# Patient Record
Sex: FEMALE | Race: WHITE | NOT HISPANIC OR LATINO | Employment: FULL TIME
[De-identification: names, ages, dates, MRNs, and addresses within clinical notes are randomized per-mention and may not be internally consistent; named-entity substitution may affect disease eponyms.]

---

## 2017-06-17 ENCOUNTER — HEALTH MAINTENANCE LETTER (OUTPATIENT)
Age: 52
End: 2017-06-17

## 2018-06-23 ENCOUNTER — HEALTH MAINTENANCE LETTER (OUTPATIENT)
Age: 53
End: 2018-06-23

## 2018-11-19 ENCOUNTER — TRANSFERRED RECORDS (OUTPATIENT)
Dept: HEALTH INFORMATION MANAGEMENT | Facility: CLINIC | Age: 53
End: 2018-11-19
Payer: COMMERCIAL

## 2019-06-18 ENCOUNTER — TRANSFERRED RECORDS (OUTPATIENT)
Dept: HEALTH INFORMATION MANAGEMENT | Facility: CLINIC | Age: 54
End: 2019-06-18
Payer: COMMERCIAL

## 2019-10-01 ENCOUNTER — HEALTH MAINTENANCE LETTER (OUTPATIENT)
Age: 54
End: 2019-10-01

## 2020-09-25 ENCOUNTER — TRANSFERRED RECORDS (OUTPATIENT)
Dept: HEALTH INFORMATION MANAGEMENT | Facility: CLINIC | Age: 55
End: 2020-09-25
Payer: COMMERCIAL

## 2020-10-09 ENCOUNTER — TRANSFERRED RECORDS (OUTPATIENT)
Dept: HEALTH INFORMATION MANAGEMENT | Facility: CLINIC | Age: 55
End: 2020-10-09
Payer: COMMERCIAL

## 2020-10-09 LAB — EJECTION FRACTION: 64 %

## 2020-10-13 ENCOUNTER — TRANSFERRED RECORDS (OUTPATIENT)
Dept: HEALTH INFORMATION MANAGEMENT | Facility: CLINIC | Age: 55
End: 2020-10-13
Payer: COMMERCIAL

## 2020-11-13 ENCOUNTER — TRANSFERRED RECORDS (OUTPATIENT)
Dept: HEALTH INFORMATION MANAGEMENT | Facility: CLINIC | Age: 55
End: 2020-11-13

## 2021-01-15 ENCOUNTER — HEALTH MAINTENANCE LETTER (OUTPATIENT)
Age: 56
End: 2021-01-15

## 2021-04-26 ENCOUNTER — TRANSFERRED RECORDS (OUTPATIENT)
Dept: HEALTH INFORMATION MANAGEMENT | Facility: CLINIC | Age: 56
End: 2021-04-26
Payer: COMMERCIAL

## 2021-04-26 LAB
ALT SERPL-CCNC: 31 U/L (ref 5–50)
AST SERPL-CCNC: 35 U/L (ref 10–35)
CHOLESTEROL (EXTERNAL): 259 MG/DL
CREATININE (EXTERNAL): 3.18 MG/DL (ref 0.5–0.9)
GLUCOSE (EXTERNAL): 172 MG/DL (ref 65–99)
GLUCOSE (EXTERNAL): 172 MG/DL (ref 65–99)
HEP C HIM: ABNORMAL
POTASSIUM (EXTERNAL): 5.1 MEQ/L (ref 3.5–5)
TRIGLYCERIDES (EXTERNAL): 139 MG/DL

## 2021-06-30 LAB
ALT SERPL-CCNC: 32 U/L (ref 5–50)
AST SERPL-CCNC: 31 U/L (ref 10–35)
CREATININE (EXTERNAL): 3.11 MG/DL (ref 0.5–0.9)
GLUCOSE (EXTERNAL): 233 MG/DL (ref 65–99)
POTASSIUM (EXTERNAL): 5.2 MEQ/L (ref 3.5–5)

## 2021-09-04 ENCOUNTER — HEALTH MAINTENANCE LETTER (OUTPATIENT)
Age: 56
End: 2021-09-04

## 2021-09-29 ENCOUNTER — TRANSFERRED RECORDS (OUTPATIENT)
Dept: HEALTH INFORMATION MANAGEMENT | Facility: CLINIC | Age: 56
End: 2021-09-29
Payer: COMMERCIAL

## 2021-09-29 LAB
CREATININE (EXTERNAL): 4.23 MG/DL (ref 0.5–0.9)
GLUCOSE (EXTERNAL): 276 MG/DL (ref 65–99)
POTASSIUM (EXTERNAL): 5.9 MEQ/L (ref 3.5–5)

## 2022-01-01 ENCOUNTER — TELEPHONE (OUTPATIENT)
Dept: TRANSPLANT | Facility: CLINIC | Age: 57
End: 2022-01-01

## 2022-01-01 ENCOUNTER — HEALTH MAINTENANCE LETTER (OUTPATIENT)
Age: 57
End: 2022-01-01

## 2022-01-01 ENCOUNTER — TRANSFERRED RECORDS (OUTPATIENT)
Dept: HEALTH INFORMATION MANAGEMENT | Facility: CLINIC | Age: 57
End: 2022-01-01

## 2022-01-01 ENCOUNTER — TEAM CONFERENCE (OUTPATIENT)
Dept: TRANSPLANT | Facility: CLINIC | Age: 57
End: 2022-01-01

## 2022-02-19 ENCOUNTER — HEALTH MAINTENANCE LETTER (OUTPATIENT)
Age: 57
End: 2022-02-19

## 2022-03-28 ENCOUNTER — TRANSFERRED RECORDS (OUTPATIENT)
Dept: HEALTH INFORMATION MANAGEMENT | Facility: CLINIC | Age: 57
End: 2022-03-28
Payer: COMMERCIAL

## 2022-04-05 ENCOUNTER — TRANSFERRED RECORDS (OUTPATIENT)
Dept: HEALTH INFORMATION MANAGEMENT | Facility: CLINIC | Age: 57
End: 2022-04-05
Payer: COMMERCIAL

## 2022-04-16 ENCOUNTER — HEALTH MAINTENANCE LETTER (OUTPATIENT)
Age: 57
End: 2022-04-16

## 2022-04-29 ENCOUNTER — REFERRAL (OUTPATIENT)
Dept: TRANSPLANT | Facility: CLINIC | Age: 57
End: 2022-04-29
Payer: COMMERCIAL

## 2022-04-29 VITALS — WEIGHT: 135 LBS | BODY MASS INDEX: 22.49 KG/M2 | HEIGHT: 65 IN

## 2022-04-29 DIAGNOSIS — N18.5 CHRONIC KIDNEY DISEASE, STAGE V (H): ICD-10-CM

## 2022-04-29 DIAGNOSIS — N18.9 CHRONIC RENAL FAILURE: ICD-10-CM

## 2022-04-29 DIAGNOSIS — Z87.891 HISTORY OF TOBACCO USE: ICD-10-CM

## 2022-04-29 DIAGNOSIS — E10.9 TYPE 1 DIABETES MELLITUS (H): ICD-10-CM

## 2022-04-29 DIAGNOSIS — I25.10 CARDIOVASCULAR DISEASE: ICD-10-CM

## 2022-04-29 DIAGNOSIS — Z01.818 ENCOUNTER FOR PRE-TRANSPLANT EVALUATION FOR KIDNEY AND PANCREAS TRANSPLANT: ICD-10-CM

## 2022-04-29 DIAGNOSIS — E10.9 DIABETES MELLITUS TYPE 1 (H): Primary | ICD-10-CM

## 2022-04-29 DIAGNOSIS — N18.6 ESRD (END STAGE RENAL DISEASE) (H): ICD-10-CM

## 2022-04-29 DIAGNOSIS — Z76.82 ORGAN TRANSPLANT CANDIDATE: ICD-10-CM

## 2022-04-29 NOTE — TELEPHONE ENCOUNTER
PCP: Dr. Anushka Palomo   Referring Provider: Self   Referring Diagnosis: ESRD/Type 1 DM, Insulin Depend.     GFR/Date: 11 (9/29/21)    Is patient under the age of 65? Yes  Is patient diabetic? Yes  Is patient on insulin? Yes  Was patient offered a pancreas transplant referral? Yes    Is patient in a group home/assisted living? No  Does patient have a guardian? No    Referral intake process completed.  Patient is aware that after financial approval is received, medical records will be requested.   Patient confirmed for a callback from transplant coordinator on 5/9/22. (within 2 weeks)  Tentative evaluation date 6/9/22 (within 4 weeks) if appointment is virtual, does patient have capabilities of setting this up? Yes, but patient would prefer coming into clinic for eval.     Confirmed coordinator will discuss evaluation process in more detail at the time of their call.   Patient is aware of the need to arrange age appropriate cancer screening, vaccinations, and dental care.  Reminded patient to complete questionnaire, complete medical records release, and review packet prior to evaluation visit .  Assessed patient for special needs (ie-wheelchair, assistance, guardian, and ):  None  Patient instructed to call [phone] with questions.     Patient gave verbal consent during intake call to obtain medical records and documents outside of [facility]:  Yes     GLORIA Escoto, LPN       Solid Organ Transplant

## 2022-05-04 ENCOUNTER — TRANSFERRED RECORDS (OUTPATIENT)
Dept: HEALTH INFORMATION MANAGEMENT | Facility: CLINIC | Age: 57
End: 2022-05-04
Payer: COMMERCIAL

## 2022-05-09 NOTE — TELEPHONE ENCOUNTER
Contacted patient and introduced myself as their Transplant Coordinator, also introduced the role of the Transplant Coordinator in the transplant process.  Explained the purpose of this call including reviewing next steps and answering questions.    Confirmed Referring Provider, Dialysis Center, and Primary Care Physician. Notified patient of the importance of continued communication with referring providers and primary care physicians.    Reviewed components of transplant evaluation process including necessary appointments, tests, and procedures.    Answered questions for patient regarding evaluation, provided my name and contact information and requested they call with any additional questions.    Determined that patient would like additional information regarding transplant by:     Drop Down choices: Mail, Email, MyChart, Phone Call   Encourage MyChart   Notified patients that they will hear from a Transplant  to schedule evaluation.       Reviewed pt's chart for pre-kidney/pancreas transplant evaluation planning. Pt lives in [place], [place] and is currently listed active status on the SPK wait list at [facility] [place] [facility], she has self-referred herself to our center for dual-listing.  She was raised in [place], and her family is all still living here, she understands the need to remain locally following transplant and states she has more support in [place] than in [place].  Additionally, she is more interested in our program because of our steroid-free protocol. Pt has CKD d/t DM1, she is not yet on dialysis, and has been listed at [facility] [place] [facility] since 10/2021. Pt was diagnosed w/ type 1 diabetes at age 35.  She endorses hypoglyemic unawareness, and uses a CGM and insulin pump.  She tries to keep her BG above 100, and reports she typically runs between 120-150 using about 27 units insulin/day. Other hx includes HTN, CAD s/p CABG 2012, + cardiolipin (seen by Hematology at [facility] [place] [facility]), cholecystectomy, hysterectomy,   x2, and pilonidal cyst excision.   BMI 22.  Last colonoscopy was 2020 (records requested).  Dental: UTD.  Pap and mammogram records requested.  Pt is a former smoker, had chest CT done 2021, will continue to monitor per protocol. Pt is independent w/ ADLs.  Pt lives w/  and disabled child, has adequate support following transplant. Pt has been vaccinated against COVID-19.  Pt not interested in [facility], would like to pursue SPK.    I also introduced [email or web address] and asked pt to create an account and view pre-kidney transplant videos for review with me following evaluation. Confirmed STD 22, interested in coming to clinic sooner if able, scheduling notified. Informed pt they will hear from scheduling to arrange the evaluation. Smartset orders entered, chart routed to scheduling pool.

## 2022-05-12 NOTE — TELEPHONE ENCOUNTER
Called patient per her request to reschedule PKE.  She is available and interested in moving up her appts to Wed 5/25 for PKE.  Will update scheduling team to reschedule in Epic.  Informed patient arrival times the same and we will send updating appt info via [employer] ([employer]

## 2022-05-18 ENCOUNTER — TELEPHONE (OUTPATIENT)
Dept: TRANSPLANT | Facility: CLINIC | Age: 57
End: 2022-05-18
Payer: COMMERCIAL

## 2022-05-18 NOTE — TELEPHONE ENCOUNTER
I/Pao Chaudhary, RN BSN called cell phone and Valery Talbot to remind her of IN PERSON PKE next Wednesday 5/25/2022.  I left her a LONG Voice message.  I did not call anyone else.     I instructed her to NOT FAST, arrive at 7:30 am.       Zulma Bautista will send email for educational materials and consent on Thursday 5/19. Her email in Snapshot. [email or web address]     If questions ok to call Sharona Tuttle.  I gave her number to call if questions.  I also asked her to call to confirm that she got this voice mail and will come next Wednesday 5/25.     I asked her to come In Person PKE [facility] address [place] by 7:30 am on 5/25.      Zulma Fitzgerald and Francy JAMES CMA in clinic.

## 2022-05-24 NOTE — PROGRESS NOTES
[facility] Solid Organ Transplant  Outpatient MNT: Kidney Pancreas Transplant Evaluation    Current BMI: 23.4 (HT 64.5 in,  lbs/63 kg)  BMI is within recommendation of <35 for KP transplant    Frailty Assessment -- Not Frail (2/5 points)- unintentional wt loss, reported exhaustion     Time Spent: 15 minutes  Visit Type: Initial   Referring Physician: Jennifer   Pt accompanied by: her brother, Lv    History of previous txp: none   Dialysis: no    Nutrition Assessment  DM I, on tandem pump w/ control IQ  Frequency of BG checks: has cgm   Last A1c: 8-9 lately per her report, cannot find any documented in the chart  Yes hypo unawareness; wakes up 2x overnight d/t low BG and must eat. She has tried HS snack, changing amt of cho at dinner time, disconnecting from pump, etc.     - Appetite: fair, grazes during the day at baseline   - Food allergies/intolerances: none   - Meal prep & grocery shopping: pt does   - Previous RD education: no   - Issues chewing or swallowing: none   - N/V/D/C: situational diarrhea ?nerves   - Food access concerns: none     Vitamins, Supplements, Pertinent Meds: none   Herbal Medicines/Supplements: none     Edema: sometimes, situational, not necessarily correlated w/ diet    Weight hx: dropped down to 107 lbs-->unplanned, unsure why with no noted changes in activity or oral intake--now back to baseline     Diet Recall  Breakfast Captain crunch w/ 1% milk    Lunch Grazes on- potato chips, grapes, carrots, peanuts    Dinner Lasagna, homemade chicken wrap     Snacks Twinkies (overnight)   Beverages 1 gallon of milk/day, 64 oz water, occasional Coke Zero   Alcohol None since 10/2020- d/t kidney disease    Dining out 1x/week      Physical Activity  Walks 2+ miles/day- year round activity - 45 minutes    Reports new fatigue in the past 2 months; she normally is a morning person but lately struggles to get up/get going     Labs  Pt reports lab stephen labs recently, but unable to view  in Care Everywhere   Pt reports K 5.1, phos is climbing, but unsure the exact value; reports supposed to take tums w/ meals but forgets   She is not interested in cutting back on milk intake     Nutrition Diagnosis  No nutrition diagnosis identified at this time.     Nutrition Intervention  Nutrition education provided:  Discussed sodium intake (low sodium foods and drinks, seasoning food without salt and tips for low sodium diet).  Reviewed K/Phos levels. Discussed taking tums with food and with milk if not eating.     Reviewed post txp diet guidelines in brief (will review in further detail post txp):  (1) Review of proper food safety measures d/t immunosuppressant therapy post-op and increased risk for food-borne illness    (2) Avoid the following post txp d/t risk for rejection, unknown effects on the organs, and/or potential interactions with immunosuppressants:  - Herbal, Chinese, holistic, chiropractic, natural, alternative medicines and supplements  - Detoxes and cleanses  - Weight loss pills  - Protein powders or other products with extracts or herbs (ie green tea extract)    (3) Med regimen and possible side effects    Patient Understanding: Pt verbalized understanding of education provided.  Expected Engagement: Good  Follow-Up Plans: PRN     Nutrition Goals  No nutrition goals identified at this time     Noris Mahoney, RD, LD, CCTD

## 2022-05-25 ENCOUNTER — ALLIED HEALTH/NURSE VISIT (OUTPATIENT)
Dept: TRANSPLANT | Facility: CLINIC | Age: 57
End: 2022-05-25
Attending: SURGERY
Payer: COMMERCIAL

## 2022-05-25 ENCOUNTER — OFFICE VISIT (OUTPATIENT)
Dept: TRANSPLANT | Facility: CLINIC | Age: 57
End: 2022-05-25
Attending: SURGERY
Payer: COMMERCIAL

## 2022-05-25 ENCOUNTER — APPOINTMENT (OUTPATIENT)
Dept: TRANSPLANT | Facility: CLINIC | Age: 57
End: 2022-05-25
Attending: SURGERY
Payer: COMMERCIAL

## 2022-05-25 ENCOUNTER — LAB (OUTPATIENT)
Dept: LAB | Facility: CLINIC | Age: 57
End: 2022-05-25
Attending: PHYSICIAN ASSISTANT

## 2022-05-25 ENCOUNTER — DOCUMENTATION ONLY (OUTPATIENT)
Dept: TRANSPLANT | Facility: CLINIC | Age: 57
End: 2022-05-25

## 2022-05-25 VITALS
BODY MASS INDEX: 23.13 KG/M2 | HEIGHT: 65 IN | OXYGEN SATURATION: 96 % | SYSTOLIC BLOOD PRESSURE: 159 MMHG | WEIGHT: 138.8 LBS | HEART RATE: 81 BPM | DIASTOLIC BLOOD PRESSURE: 74 MMHG

## 2022-05-25 DIAGNOSIS — R82.81 PYURIA: Primary | ICD-10-CM

## 2022-05-25 DIAGNOSIS — E10.9 TYPE 1 DIABETES MELLITUS (H): ICD-10-CM

## 2022-05-25 DIAGNOSIS — I25.10 CARDIOVASCULAR DISEASE: ICD-10-CM

## 2022-05-25 DIAGNOSIS — N18.9 CHRONIC RENAL FAILURE: ICD-10-CM

## 2022-05-25 DIAGNOSIS — N18.6 ESRD (END STAGE RENAL DISEASE) (H): ICD-10-CM

## 2022-05-25 DIAGNOSIS — Z01.818 ENCOUNTER FOR PRE-TRANSPLANT EVALUATION FOR KIDNEY AND PANCREAS TRANSPLANT: ICD-10-CM

## 2022-05-25 DIAGNOSIS — N18.5 CHRONIC KIDNEY DISEASE, STAGE V (H): ICD-10-CM

## 2022-05-25 DIAGNOSIS — Z01.818 ENCOUNTER FOR PRE-TRANSPLANT EVALUATION FOR KIDNEY AND PANCREAS TRANSPLANT: Primary | ICD-10-CM

## 2022-05-25 DIAGNOSIS — Z87.891 HISTORY OF TOBACCO USE: ICD-10-CM

## 2022-05-25 DIAGNOSIS — E10.69 TYPE 1 DIABETES MELLITUS WITH OTHER SPECIFIED COMPLICATION (H): ICD-10-CM

## 2022-05-25 DIAGNOSIS — R82.81 PYURIA: ICD-10-CM

## 2022-05-25 DIAGNOSIS — Z76.82 ORGAN TRANSPLANT CANDIDATE: ICD-10-CM

## 2022-05-25 DIAGNOSIS — N18.5 TYPE 1 DIABETES MELLITUS WITH STAGE 5 CHRONIC KIDNEY DISEASE NOT ON CHRONIC DIALYSIS (H): ICD-10-CM

## 2022-05-25 DIAGNOSIS — E10.9 DIABETES MELLITUS TYPE 1 (H): ICD-10-CM

## 2022-05-25 DIAGNOSIS — E10.22 TYPE 1 DIABETES MELLITUS WITH STAGE 5 CHRONIC KIDNEY DISEASE NOT ON CHRONIC DIALYSIS (H): ICD-10-CM

## 2022-05-25 LAB
ABO/RH(D): NORMAL
ABO/RH(D): NORMAL
ALBUMIN SERPL-MCNC: 3.3 G/DL (ref 3.4–5)
ALBUMIN UR-MCNC: 100 MG/DL
ALP SERPL-CCNC: 109 U/L (ref 40–150)
ALT SERPL W P-5'-P-CCNC: 34 U/L (ref 0–50)
ANION GAP SERPL CALCULATED.3IONS-SCNC: 13 MMOL/L (ref 3–14)
ANTIBODY SCREEN: NEGATIVE
APPEARANCE UR: CLEAR
APTT PPP: 33 SECONDS (ref 22–38)
AST SERPL W P-5'-P-CCNC: 29 U/L (ref 0–45)
ATRIAL RATE - MUSE: 77 BPM
BASOPHILS # BLD AUTO: 0.1 10E3/UL (ref 0–0.2)
BASOPHILS NFR BLD AUTO: 1 %
BILIRUB SERPL-MCNC: 0.3 MG/DL (ref 0.2–1.3)
BILIRUB UR QL STRIP: NEGATIVE
BUN SERPL-MCNC: 107 MG/DL (ref 7–30)
CALCIUM SERPL-MCNC: 9 MG/DL (ref 8.5–10.1)
CHLORIDE BLD-SCNC: 107 MMOL/L (ref 94–109)
CO2 SERPL-SCNC: 22 MMOL/L (ref 20–32)
COLOR UR AUTO: ABNORMAL
CREAT SERPL-MCNC: 6.76 MG/DL (ref 0.52–1.04)
DIASTOLIC BLOOD PRESSURE - MUSE: NORMAL MMHG
EOSINOPHIL # BLD AUTO: 0.2 10E3/UL (ref 0–0.7)
EOSINOPHIL NFR BLD AUTO: 4 %
ERYTHROCYTE [DISTWIDTH] IN BLOOD BY AUTOMATED COUNT: 11.9 % (ref 10–15)
FACTOR 2 INTERPRETATION: NORMAL
FACTOR V INTERPRETATION: NORMAL
GFR SERPL CREATININE-BSD FRML MDRD: 7 ML/MIN/1.73M2
GLUCOSE BLD-MCNC: 197 MG/DL (ref 70–99)
GLUCOSE UR STRIP-MCNC: >499 MG/DL
HBA1C MFR BLD: 7.2 % (ref 0–5.6)
HCT VFR BLD AUTO: 31.8 % (ref 35–47)
HGB BLD-MCNC: 10.5 G/DL (ref 11.7–15.7)
HGB UR QL STRIP: ABNORMAL
IMM GRANULOCYTES # BLD: 0 10E3/UL
IMM GRANULOCYTES NFR BLD: 0 %
INR PPP: 0.9 (ref 0.85–1.15)
INTERPRETATION ECG - MUSE: NORMAL
KETONES UR STRIP-MCNC: NEGATIVE MG/DL
LAB DIRECTOR COMMENTS: NORMAL
LAB DIRECTOR DISCLAIMER: NORMAL
LAB DIRECTOR INTERPRETATION: NORMAL
LAB DIRECTOR METHODOLOGY: NORMAL
LAB DIRECTOR RESULTS: NORMAL
LEUKOCYTE ESTERASE UR QL STRIP: ABNORMAL
LYMPHOCYTES # BLD AUTO: 0.9 10E3/UL (ref 0.8–5.3)
LYMPHOCYTES NFR BLD AUTO: 19 %
MCH RBC QN AUTO: 28.5 PG (ref 26.5–33)
MCHC RBC AUTO-ENTMCNC: 33 G/DL (ref 31.5–36.5)
MCV RBC AUTO: 86 FL (ref 78–100)
MONOCYTES # BLD AUTO: 0.4 10E3/UL (ref 0–1.3)
MONOCYTES NFR BLD AUTO: 8 %
NEUTROPHILS # BLD AUTO: 3.1 10E3/UL (ref 1.6–8.3)
NEUTROPHILS NFR BLD AUTO: 68 %
NITRATE UR QL: NEGATIVE
NRBC # BLD AUTO: 0 10E3/UL
NRBC BLD AUTO-RTO: 0 /100
P AXIS - MUSE: 56 DEGREES
PH UR STRIP: 6 [PH] (ref 5–7)
PLATELET # BLD AUTO: 294 10E3/UL (ref 150–450)
POTASSIUM BLD-SCNC: 4.4 MMOL/L (ref 3.4–5.3)
PR INTERVAL - MUSE: 182 MS
PROT SERPL-MCNC: 7.4 G/DL (ref 6.8–8.8)
QRS DURATION - MUSE: 96 MS
QT - MUSE: 380 MS
QTC - MUSE: 430 MS
R AXIS - MUSE: 4 DEGREES
RBC # BLD AUTO: 3.68 10E6/UL (ref 3.8–5.2)
RBC URINE: 1 /HPF
SODIUM SERPL-SCNC: 142 MMOL/L (ref 133–144)
SP GR UR STRIP: 1.01 (ref 1–1.03)
SPECIMEN DESCRIPTION: NORMAL
SPECIMEN EXPIRATION DATE: NORMAL
SPECIMEN EXPIRATION DATE: NORMAL
SQUAMOUS EPITHELIAL: 1 /HPF
SYSTOLIC BLOOD PRESSURE - MUSE: NORMAL MMHG
T AXIS - MUSE: 52 DEGREES
T PALLIDUM AB SER QL: NONREACTIVE
UROBILINOGEN UR STRIP-MCNC: NORMAL MG/DL
VENTRICULAR RATE- MUSE: 77 BPM
WBC # BLD AUTO: 4.6 10E3/UL (ref 4–11)
WBC URINE: 6 /HPF

## 2022-05-25 PROCEDURE — 85730 THROMBOPLASTIN TIME PARTIAL: CPT

## 2022-05-25 PROCEDURE — 87086 URINE CULTURE/COLONY COUNT: CPT

## 2022-05-25 PROCEDURE — 99215 OFFICE O/P EST HI 40 MIN: CPT

## 2022-05-25 PROCEDURE — 86803 HEPATITIS C AB TEST: CPT

## 2022-05-25 PROCEDURE — 83036 HEMOGLOBIN GLYCOSYLATED A1C: CPT

## 2022-05-25 PROCEDURE — 87340 HEPATITIS B SURFACE AG IA: CPT

## 2022-05-25 PROCEDURE — 86901 BLOOD TYPING SEROLOGIC RH(D): CPT

## 2022-05-25 PROCEDURE — 86644 CMV ANTIBODY: CPT

## 2022-05-25 PROCEDURE — 86704 HEP B CORE ANTIBODY TOTAL: CPT

## 2022-05-25 PROCEDURE — 84681 ASSAY OF C-PEPTIDE: CPT

## 2022-05-25 PROCEDURE — 85670 THROMBIN TIME PLASMA: CPT

## 2022-05-25 PROCEDURE — 86481 TB AG RESPONSE T-CELL SUSP: CPT

## 2022-05-25 PROCEDURE — 36415 COLL VENOUS BLD VENIPUNCTURE: CPT

## 2022-05-25 PROCEDURE — 86780 TREPONEMA PALLIDUM: CPT

## 2022-05-25 PROCEDURE — 86886 COOMBS TEST INDIRECT TITER: CPT

## 2022-05-25 PROCEDURE — 85610 PROTHROMBIN TIME: CPT

## 2022-05-25 PROCEDURE — 81240 F2 GENE: CPT

## 2022-05-25 PROCEDURE — 86665 EPSTEIN-BARR CAPSID VCA: CPT

## 2022-05-25 PROCEDURE — 80053 COMPREHEN METABOLIC PANEL: CPT

## 2022-05-25 PROCEDURE — 99205 OFFICE O/P NEW HI 60 MIN: CPT

## 2022-05-25 PROCEDURE — 86832 HLA CLASS I HIGH DEFIN QUAL: CPT

## 2022-05-25 PROCEDURE — 86147 CARDIOLIPIN ANTIBODY EA IG: CPT

## 2022-05-25 PROCEDURE — G0452 MOLECULAR PATHOLOGY INTERPR: HCPCS | Mod: 26 | Performed by: STUDENT IN AN ORGANIZED HEALTH CARE EDUCATION/TRAINING PROGRAM

## 2022-05-25 PROCEDURE — 86833 HLA CLASS II HIGH DEFIN QUAL: CPT

## 2022-05-25 PROCEDURE — 99417 PROLNG OP E/M EACH 15 MIN: CPT

## 2022-05-25 PROCEDURE — 81382 HLA II TYPING 1 LOC HR: CPT

## 2022-05-25 PROCEDURE — 85025 COMPLETE CBC W/AUTO DIFF WBC: CPT

## 2022-05-25 PROCEDURE — 86787 VARICELLA-ZOSTER ANTIBODY: CPT

## 2022-05-25 PROCEDURE — 86706 HEP B SURFACE ANTIBODY: CPT

## 2022-05-25 PROCEDURE — 81001 URINALYSIS AUTO W/SCOPE: CPT

## 2022-05-25 PROCEDURE — 86850 RBC ANTIBODY SCREEN: CPT

## 2022-05-25 PROCEDURE — 85390 FIBRINOLYSINS SCREEN I&R: CPT | Mod: 26 | Performed by: PATHOLOGY

## 2022-05-25 RX ORDER — GLUCAGON INJECTION, SOLUTION 1 MG/.2ML
INJECTION, SOLUTION SUBCUTANEOUS
COMMUNITY
Start: 2022-05-09

## 2022-05-25 RX ORDER — SODIUM BICARBONATE 650 MG/1
TABLET ORAL
COMMUNITY
Start: 2021-11-24

## 2022-05-25 RX ORDER — CARVEDILOL 12.5 MG/1
12.5 TABLET ORAL
COMMUNITY
Start: 2022-02-24

## 2022-05-25 RX ORDER — BLOOD SUGAR DIAGNOSTIC
STRIP MISCELLANEOUS
COMMUNITY
Start: 2021-10-26

## 2022-05-25 RX ORDER — NIFEDIPINE 90 MG/1
TABLET, FILM COATED, EXTENDED RELEASE ORAL
COMMUNITY
Start: 2022-05-02

## 2022-05-25 RX ORDER — TORSEMIDE 20 MG/1
20 TABLET ORAL
COMMUNITY
Start: 2022-02-28

## 2022-05-25 RX ORDER — FUROSEMIDE 80 MG
80 TABLET ORAL
COMMUNITY

## 2022-05-25 NOTE — PROGRESS NOTES
Psychosocial Assessment  Patient Name/ Age: Valery Talbot 56 year old   Medical Record #: [id number]  Duration of Interview:     40  min  Process:   Face-to-Face Interview                (counseling < 50%)   Present at Appointment: Eva and her brother Lv        :ROBERT Yang, LICSW Date:  May 25, 2022        Type of transplant: Kidney/Pancreas    Donor type:   Eva indicated she does not know of any potential kidney donors.   Cadaver   Prior Transplants:    No Status of Transplant:       Current Living Situation    Location:   [facility]  With Whom:  Hao, daughter Hansel (30) who is disabled and Eva's mother       Family/ Social Support:    Eva has a son (35) who lives in [place].  She also has a step daughter Roseanna (36) lives in [place]. Eva and Hao have five grandchildren.  Lv lives in [place], [place].  Eva has three additional brothers ([place], [place], [place], [place] and [place], [place]) and a sister ([place], [place]).   Available, helpful   Committed relationship:  Eva and Hao have been  for 31 years.   Stable/supportive   Other supports:   Friends Available, helpful       Activities/ Functional Ability    Current level: Ambulatory and independent with ADL's     Transportation Drives self       Vocational/Employment/Financial     Employment  Contractor doing window estimates   Full time   Job Description      Income  Hao is unemployed.   Salary/wages   Insurance  BCBS through Eva's employer.    At this time, patient can afford medication costs:  Yes  Private Insurance       Medical Status    Current mode of treatment for ESRD None   Complications - Diabetes controlled with insulin pump. Glucose Unawareness       Behavioral    Tobacco Use No Chemical Dependency No   Eva indicated he quit smoking in July 2021. Eva indicated she quit drinking in October 2020, no treatment required.     Psychiatric  Impairment No      Reading ability Good  Education Level: Some College Recent Legal History No  Eva indicated she had legal issues 30 years ago.    Coping Style/Strategies: Eva indicated when under stress she will cut her nails or deal with it.   Ability to Adhere to Complex Medical Regime: Yes     Adherence History:  Eva indicated she will usually follow her physician's recommendations.        Education  _X_ Medicare  _X_ Rehabilitation  _X_ Donor issues  _X_ Community resources  _X_ Post discharge housing  _X_ Financial resources  _X_ Medical insurance options  _X_ Psych adjustment  _X_ Family adjustment  _X_ Health Care Directive - Provided Education and Declined Completing at this time.  Eva indicated she is in agreement with her  making her medical decisions for him if she is unable. Psychosocial Risks of Transplant Reviewed and Discussed:  _X_ Increased stress related to emotional,            family, social, employment or financial           situation  _X_ Affect on work and/or disability benefits  _X_ Affect on future life insurance  _X_ Transplant outcome expectations may           not be met  _X_ Mental Health Risks: anxiety,           depression, PTSD, guilt, grief and           chronic fatigue     Notable Items:   None noted.       Final Evaluation/Assessment   Patient seemed to process information well. Appeared well informed, motivated and able to follow post transplant requirements. Behavior was appropriate during interview. Has adequate income and insurance coverage. Adequate social support. No major contraindications noted for transplant.  At this time patient appears to understand the risks and benefits of transplant.      Recommendation  Acceptable    Selection Criteria Met:  Plan for support Yes   Chemical Dependence Yes   Smoking Yes   Mental Health Yes   Adequate Finances Yes    Signature: ROBERT Yang, [facility]   Title: Clinical
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
## 2022-05-25 NOTE — NURSING NOTE
"Chief Complaint   Patient presents with     Consult     PKE EVAL     Blood pressure (!) 169/77, pulse 81, height 1.638 m (5' 4.5\"), weight 63 kg (138 lb 12.8 oz), last menstrual period 08/08/2006, SpO2 96 %.    Francy Johnson on 5/25/2022 at 7:47 AM    "
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
## 2022-05-25 NOTE — PROGRESS NOTES
"Kidney, Pancreas Transplant Referral - 2022  Valery Talbot attended the pre-transplant patient evaluation IN PERSON 2022 with her brother, Lv.  The [facility] website pre-transplant modules for both kidney and pancreas were viewed at home prior to her evaluation ;   Content reviewed:    Living Donation and how to access that program    Paired exchange    Kidney Donor Profile Index (KDPI)  Signed NOT WILLING to accept organ >85%, \" kidney donors get scored 1-100 based on their age, sex, race, cause of death and organ function.  This score is a very general predictor of future kidney performance. On average, kidneys with a higher score may not function as long as kidneys with a lower score.  This score does not necessarily predict how this specific kidney will perform.  We use this score plus other information like the creatinine and urine output to help us match kidneys.  Dr. Rodriguez, surgeon advised her to reconsider recommendation after she starts dialysis. Surgeon  review KDPI and give recommendations of what is appropriate for her to consider.\"    Waiting list issues (right to decline without penalty, high PHS risk donors, what to expect when called with an offer)    Hospital experience,  length of stay , need to stay locally post-discharge (2-4 weeks)    Surgical options (with pictures)                             Post-surgery lifting and driving restrictions    Post-transplant routines, frequency of lab work and clinic visits    Need to stay locally post-discharge (2-4 weeks)    Role of Transplant Coordinator    Participants were informed of the benefits of transplant as well as potential risks such as infection, cancer, and death.  The need for total adherence with immunosuppression medications and following transplant regimens was stressed.  The overall evaluation/approval/listing process was reviewed.        The patient was provided with the following documents:  What You " "Need to Know About a Kidney Transplant  Adult Kidney Transplant - A Guide for Patients  SRTR Data Sheet - Kidney  Brochure - Kidney Allocation  What You Need to Know About a Pancreas Transplant  Adult Pancreas Transplant-A Guide for Patients  SRTR Data Sheet - Pancreas  Brochure - Pancreas  SRTR Data Sheet - Kidney/Pancreas  Brochure - SPK  Brochure - Multiple Listing and Waiting Time Transfer  What Every Patient Needs to Know (UNOS)  UNOS Facts and Figures  Finding a Donor  [facility] - Quick Start Guide  [facility] Consent  Receipt of Information form    Valery Talbot signed the paper copy of the [facility]  Receipt of Information for Organ Transplant Recipient.\" She also signed the KDPI form NOT WILLING to accept organ offer >85%.  She was provided Sharona Tuttle's business card and instructed to call with additional questions.      Summary    Team s concerns/comments: Diabetes Type I , Wait list active at [facility], [employer] unit wants to multiple list.  Family in [place].     Candidacy category: Green     Action/Plan:  Dr. Rodriguez requesets cardiac assessment   Multiple wait list, Valery to determine after we list her active if she wants to transfer longest wait time (October 2021)  from [place] to [facility].  Wait time transfer  Form can be obtained from her coordinator in [place] or from us at [facility].   I reviewed her providers in [facility]. Pao to discuss information on Wait List time transfer Valery received from our intake staff.  The MD updates below have been submitted to our Admin team to request HIM to load into the Epic MD provider list and put into her snapshot page.  Team to review her on 6/1 Sharona sheriff and will discuss team decision with her after 6/1/2022 meeting.      Expected Selection Meeting Discussion: June 1, 2022     New providers need to be added to Epic pick list.  I sent this list to our admin staff to have Epic corporate add names as soon as possible. "     1) Matt Garcia MD  [facility]  [address] [place] [address]  Phone Office: [phone]  FAX: [phone]    I/Pao Chaudhary RN BS asked nephrology department to fax most recent notes to Transplant Office.  Admin provider will fax to [phone] today, please watch for records.       2) Anushka Palomo MD  (PCP)  [address]  [place] [address]  Phone [phone]  [phone]    3)Elo Hernandez MD   [facility]  [address] [place] [address]  Phone [phone]   TEXT [phone]  FAX     I/ Pao Chaudhary RN BS called [facility].  I Left voice message asking them to fax Dr. Hernandez's notes to our transplant to Transplant Office.   Sharona and admin staff to watch for records.  This was requested by TYLER Leon

## 2022-05-26 LAB
C PEPTIDE SERPL-MCNC: <0.1 NG/ML (ref 0.9–6.9)
CARDIOLIPIN IGG SER IA-ACNC: 4.5 GPL-U/ML
CARDIOLIPIN IGG SER IA-ACNC: NEGATIVE
CARDIOLIPIN IGM SER IA-ACNC: 2.3 MPL-U/ML
CARDIOLIPIN IGM SER IA-ACNC: NEGATIVE
CMV IGG SERPL IA-ACNC: 7.2 U/ML
CMV IGG SERPL IA-ACNC: ABNORMAL
DRVVT SCREEN RATIO: 0.89
EBV VCA IGG SER IA-ACNC: >750 U/ML
EBV VCA IGG SER IA-ACNC: POSITIVE
HBV CORE AB SERPL QL IA: NONREACTIVE
HBV SURFACE AB SERPL IA-ACNC: 0.72 M[IU]/ML
HBV SURFACE AG SERPL QL IA: NONREACTIVE
HCV AB SERPL QL IA: NONREACTIVE
HIV 1+2 AB+HIV1 P24 AG SERPL QL IA: NONREACTIVE
LA PPP-IMP: NEGATIVE
LUPUS INTERPRETATION: NORMAL
PTT RATIO: 1.19
THROMBIN TIME: 18.3 SECONDS (ref 13–19)
VZV IGG SER QL IA: 1550 INDEX
VZV IGG SER QL IA: POSITIVE

## 2022-05-27 LAB
BACTERIA UR CULT: NORMAL
GAMMA INTERFERON BACKGROUND BLD IA-ACNC: 0.06 IU/ML
M TB IFN-G BLD-IMP: NEGATIVE
M TB IFN-G CD4+ BCKGRND COR BLD-ACNC: 9.94 IU/ML
MITOGEN IGNF BCKGRD COR BLD-ACNC: 0.03 IU/ML
MITOGEN IGNF BCKGRD COR BLD-ACNC: 0.05 IU/ML
QUANTIFERON MITOGEN: 10 IU/ML
QUANTIFERON NIL TUBE: 0.06 IU/ML
QUANTIFERON TB1 TUBE: 0.09 IU/ML
QUANTIFERON TB2 TUBE: 0.11

## 2022-05-31 ENCOUNTER — TRANSFERRED RECORDS (OUTPATIENT)
Dept: HEALTH INFORMATION MANAGEMENT | Facility: CLINIC | Age: 57
End: 2022-05-31
Payer: COMMERCIAL

## 2022-05-31 LAB
A1 AB TITR SERPL: 16 {TITER}
A1 AB TITR SERPL: 32 {TITER}
A2 AB TITR SERPL: 4 {TITER}
A2 AB TITR SERPL: 4 {TITER}
ANTIBODY TITER IGM SCREEN: NEGATIVE
EJECTION FRACTION: 60 %
SPECIMEN EXPIRATION DATE: NORMAL

## 2022-06-01 ENCOUNTER — COMMITTEE REVIEW (OUTPATIENT)
Dept: TRANSPLANT | Facility: CLINIC | Age: 57
End: 2022-06-01
Payer: COMMERCIAL

## 2022-06-02 ENCOUNTER — TELEPHONE (OUTPATIENT)
Dept: TRANSPLANT | Facility: CLINIC | Age: 57
End: 2022-06-02
Payer: COMMERCIAL

## 2022-06-02 LAB
A*: NORMAL
A*LOCUS SEROLOGIC EQUIVALENT: 3
A*LOCUS: NORMAL
A*SEROLOGIC EQUIVALENT: 32
ABTEST METHOD: NORMAL
B*: NORMAL
B*LOCUS SEROLOGIC EQUIVALENT: 27
B*LOCUS: NORMAL
B*SEROLOGIC EQUIVALENT: 61
BW-1: NORMAL
BW-2: NORMAL
C*LOCUS SEROLOGIC EQUIVALENT: 2
C*LOCUS: NORMAL
DPA1*: NORMAL
DPB1*: NORMAL
DPB1*LOCUS NMDP: NORMAL
DPB1*LOCUS: NORMAL
DPB1*NMDP: NORMAL
DQA1*LOCUS: NORMAL
DQB1*LOCUS SEROLOGIC EQUIVALENT: 8
DQB1*LOCUS: NORMAL
DRB1*LOCUS SEROLOGIC EQUIVALENT: 4
DRB1*LOCUS: NORMAL
DRB4*LOCUS SEROLOGIC EQUIVALENT: 53
DRB4*LOCUS: NORMAL
DRSSO TEST METHOD: NORMAL
PROTOCOL CUTOFF: NORMAL
SA 1 CELL: NORMAL
SA 1 TEST METHOD: NORMAL
SA 2 CELL: NORMAL
SA 2 TEST METHOD: NORMAL
SA1 HI RISK ABY: NORMAL
SA1 MOD RISK ABY: NORMAL
SA2 HI RISK ABY: NORMAL
SA2 MOD RISK ABY: NORMAL
UNACCEPTABLE ANTIGENS: NORMAL
UNOS CPRA: 0
ZZZSA 1  COMMENTS: NORMAL
ZZZSA 2 COMMENTS: NORMAL

## 2022-06-02 NOTE — LETTER
Request for Records from Referring Providers Office for Patients Referred to [facility] Solid Organ Transplant Program    June 7, 2022    Re: Valery Talbot   [address] [place] [address]   1965      STAT request for the following records:     Most recent echocardiogram  All office visit notes w/ Dr. Yadav for pt's pre-transplant cardiac work up  Any cardiac test results        Please fax requested records to: [phone]    Please send all scans/slides to:   [facility]  Solid Organ Transplant Office  [address] [place] [address]    Please call our office at [phone] if you have any questions or concerns.

## 2022-06-02 NOTE — TELEPHONE ENCOUNTER
Called pt to discuss outcome of selection committee.  We discussed that she will need the following items:    Cardiac risk assessment w/ coronary angiogram  PFTs  Iliac US  Review of CT  PFTs  Updated low dose chest CT    She completed her echo w/ her local cardiologist on Monday - will request records. Cardiologist- Dr. Yadav at [facility] in [place], [place].  She expressed frustration regarding the angiogram- she would not be able to undergo it until on dialysis and her objective w/ transplant was to avoid dialysis.  She stated she was told during [facility] a stress test would suffice, I offered to bring it back to committee next week for further discussion.  Orders sent to PCP and nephrologist for iliac US, PFTs, and chest CT.  Eval Summary sent. Pt had no further questions at this time.    Called pt's PCP office and requested health maintenance items be faxed, requested CT imaging via email.  Called pt's cardiology office, need faxed auth to share records- sent high priority message to admins to obtain all cardiac work up records.

## 2022-06-02 NOTE — LETTER
06/02/22        Valery Talbot  [address]  [place] [facility] [address]        Dear Valery,    It was a pleasure to see you recently for consideration of kidney and pancreas transplantation. Your pre-transplant evaluation results were reviewed at our Multidisciplinary Selection Committee on 6/1/22. The Committee has approved you as  a candidate, once your abdominal CT is reviewed by surgery, you will be placed on the wait list inactive status.  This means you will accrue wait time, but will not be eligible for organ offers until your evaluation is completed and approved by the Selection Committee.  The Committee is requesting the following items are completed as part of your evaluation:    1. Cardiac risk assessment to include coronary angiogram (once on dialysis) - we will send orders for this to be done locally    2. Iliac ultrasound -  we will send orders for this to be done locally    3. Pulmonary Function Tests (PFTs) -  we will send orders for this to be done locally    4. Low dose Chest CT due July 2022 - please work with the managing provider to complete    Once the above items are completed, your case will be reviewed by the Committee to be considered for active listing status on the kidney transplant wait list.  For any questions, please contact the Transplant Office at [phone] or you may reach me directly at [phone]        Sincerely,  Sharona Tuttle RN, Pre-Kidney/Pancreas Transplant Coordinator     Solid Organ Transplant  [facility], [facility]    CC: Dr. Anushka Contreras, Dr. Liam Fernandez, Dr. Josefina Rico

## 2022-06-02 NOTE — LETTER
Request for Records from Referring Providers Office for Patients Referred to [facility] Solid Organ Transplant Program    June 7, 2022    Re: Valery Talbot   [address] [address] [address]   1965    STAT request for the following records - needed immediately for pt's pre-kidney/pancreas transplant evaluation    Imaging pushed - 4/26/2021 Abdominal/Pelvic CT        In addition to the above records, please include all lab results from the last 12 months.        Requested imaging may be electronically sent to the [employer] system via [id number] DICOM connection. When unable to send imaging electronically, an exported DICOM CD may be sent. Please indicate when imaging has been sent electronically on your return cover sheet.    Requested pathology slides should be accompanied by the appropriate report from your institution.    When the patient is hand carrying requested records or the requested records are not at your facility, please indicate this information on a return cover sheet.    Please fax requested paper records to [phone]     Please send all scans/slides to:   [facility]  Solid Organ Transplant Office  [address] [place] [address]    Please call our office at [phone] if you have any questions or concerns.

## 2022-06-02 NOTE — LETTER
Request for Records from Referring Providers Office for Patients Referred to [facility] Solid Organ Transplant Program    June 7, 2022    Re: Valery Talbot   [address] [place] [address]   1965    STAT request for the following records:    Most recent colonoscopy and pathology report  Most recent mammogram report     Please fax requested paper records to [phone].     Please send all scans/slides to:   [facility]  Solid Organ Transplant Office  [address] [place] [address]    Please call our office at [phone] if you have any questions or concerns.

## 2022-06-02 NOTE — COMMITTEE REVIEW
Abdominal Committee Review Note     Evaluation Date: 5/25/2022  Committee Review Date: 6/1/2022    Organ being evaluated for: Kidney/Pancreas    Transplant Phase: Evaluation  Transplant Status: Active    Transplant Coordinator: Sharona Tuttle  Transplant Surgeon: Dr. Pratibha Rodriguez       Referring Physician: Referred Self    Primary Diagnosis: DM1  Secondary Diagnosis: CKD    Committee Review Members:  Benja, Lefty Chaudhari, ALEX   Nephrology Renaldo Stockton MD, Eleazar Gerber, APRN CNP, Sharif Handy MD   Pharmacy Herminio Fuentes, [facility]    - Clinical Airam Paty Monzon, [facility]   Transplant Dixie Helm PA-C, Thu Mcfadden, RN, Sharona Tuttle, RN, Kermit Belle MD, Dariana Villanueva, RN, Yany Oconnor, RN, Ashley Leary, RN, hCika Murdock, RN       Transplant Eligibility: Insulin-dependent diabetes mellitus, Irreversible chronic kidney disease treated w/dialysis or expected need for dialysis    Committee Review Decision: Approved    Relative Contraindications: None    Absolute Contraindications: None    Committee Chair Kermit Belle MD verbally attested to the committee's decision.    Committee Discussion Details: Reviewed pt's medical status and evaluation results to date.  Pt is determined to be an approved candidate for SPK transplant. Dr. Stockton recommends the following items be completed as part of the pt's evaluation: cardiac risk assessment to include coronary angiogram (once pt is on dialysis), iliac US, review of outside CT, PFTs, and low dose chest CT (due again 7/2022).  Will list the pt inactive status once CT imaging is reviewed.  Pt will be re-discussed upon successful completion of all evaluation items.

## 2022-06-07 ENCOUNTER — TELEPHONE (OUTPATIENT)
Dept: TRANSPLANT | Facility: CLINIC | Age: 57
End: 2022-06-07
Payer: COMMERCIAL

## 2022-06-07 ENCOUNTER — TELEPHONE (OUTPATIENT)
Dept: TRANSPLANT | Facility: CLINIC | Age: 57
End: 2022-06-07

## 2022-06-07 NOTE — TELEPHONE ENCOUNTER
Shola Greene called regarding the sending some images on a CD. Facility will be ending in regular mail as our FEDX acct is having some issues.

## 2022-06-08 ENCOUNTER — COMMITTEE REVIEW (OUTPATIENT)
Dept: TRANSPLANT | Facility: CLINIC | Age: 57
End: 2022-06-08
Payer: COMMERCIAL

## 2022-06-08 NOTE — COMMITTEE REVIEW
Abdominal Patient Discussion Note Transplant Coordinator: Sharona Tuttle  Transplant Surgeon: Dr. Pratibha Rodriguez    Referring Physician: Referred Self    Committee Review Members:  Nephrology Renaldo Stockton MD, Eleazar Gerber, APRN CNP, Demar Aguilar MD, Jayda Jewell MD   Pharmacist Lexie Guevara, [facility]    - Clinical Airamtigist Monzon, [facility], Dariana Dinero, [facility]   Transplant Dixie Helm PA-C, Tuh Mcfadden, RN, Sharona Tuttle, RN, Dariana Villanueva, RN, Carmelo White, RN, Yany Oconnor, MARLENA, Meena Stewart MD, Ashley Leary, MARLENA, Chika Murdock RN   Transplant Surgery Meena Stewart MD       Additional Discussion Notes and Findings: Pt was originally discussed by the Committee on 6/2/22 and the Committee requested a coronary angiogram as part of the pt's pre-kidney/pancreas transplant work up.  The pt requested the Committee re-consider that decision since she is not on dialysis and is hoping to get transplanted before needing dialysis.  The Committee discussed her cardiac status again today, due to her long standing diabetes, former smoking hx, and known hx of CAD, she will be required to undergo an angiogram prior to active listing status.  Pt will be called w/ outcome.

## 2022-06-09 ENCOUNTER — TELEPHONE (OUTPATIENT)
Dept: TRANSPLANT | Facility: CLINIC | Age: 57
End: 2022-06-09
Payer: COMMERCIAL

## 2022-06-09 NOTE — TELEPHONE ENCOUNTER
"Pt called inquiring about outcome of Selection Committee.  I explained that she will need to undergo a coronary angiogram before she will be eligible for active listing status.  She said she is \"very disappointed\". She ended the call abruptly but told me she would let me know once she starts dialysis and can undergo an angiogram.     Spoke w/ pt's Transplant Coordinator, Karla, at [facility].  She verified that the pt was listed in 10/2021 there.  Reviewed pt's transplant evaluation status at our center.  She provided her direct line for follow up: [phone].  "

## 2022-06-20 ENCOUNTER — TRANSFERRED RECORDS (OUTPATIENT)
Dept: HEALTH INFORMATION MANAGEMENT | Facility: CLINIC | Age: 57
End: 2022-06-20

## 2022-06-20 PROBLEM — Z87.891 FORMER TOBACCO USE: Status: ACTIVE | Noted: 2022-06-20

## 2022-06-20 PROBLEM — I25.10 CAD (CORONARY ARTERY DISEASE): Status: ACTIVE | Noted: 2022-06-20

## 2022-06-20 PROBLEM — I73.9 PAD (PERIPHERAL ARTERY DISEASE) (H): Status: ACTIVE | Noted: 2022-06-20

## 2022-06-20 PROBLEM — D63.1 ANEMIA IN CHRONIC KIDNEY DISEASE: Status: ACTIVE | Noted: 2022-06-20

## 2022-06-20 PROBLEM — N18.5 CKD (CHRONIC KIDNEY DISEASE) STAGE 5, GFR LESS THAN 15 ML/MIN (H): Status: ACTIVE | Noted: 2022-06-20

## 2022-06-20 PROBLEM — N18.9 ANEMIA IN CHRONIC KIDNEY DISEASE: Status: ACTIVE | Noted: 2022-06-20

## 2022-06-20 PROBLEM — N25.81 SECONDARY RENAL HYPERPARATHYROIDISM (H): Status: ACTIVE | Noted: 2022-06-20

## 2022-06-24 ENCOUNTER — TRANSFERRED RECORDS (OUTPATIENT)
Dept: HEALTH INFORMATION MANAGEMENT | Facility: CLINIC | Age: 57
End: 2022-06-24

## 2022-06-28 ENCOUNTER — TEAM CONFERENCE (OUTPATIENT)
Dept: TRANSPLANT | Facility: CLINIC | Age: 57
End: 2022-06-28

## 2022-06-29 ENCOUNTER — TELEPHONE (OUTPATIENT)
Dept: TRANSPLANT | Facility: CLINIC | Age: 57
End: 2022-06-29

## 2022-06-29 NOTE — LETTER
2022    Valery Talbot  [address] [facility] [address]      Dear Ms. Talbot,    This letter is sent to confirm that you are a candidate in the kidney and pancreas transplant program at the [facility].  You were placed on the kidney and pancreas inactive waitlist on 22.  This means you will accumulate waiting time but not receive  donor calls until you are listed active status.    Items we will need from you:      We have received approval from you insurance company for the transplant procedure.  It is critical that you notify us if there is any change in your insurance.  It is also important that you familiarize yourself with the details of your specific insurance policy.  Our patient  is available to assist you if you should have any questions regarding your coverage.      Once you are listed active status, an ALA or PRA blood sample may need to be sent here every 3 months to match you with  donors or any potential living donors.  At that time, special mailing boxes (called mailers) will be sent to you directly from the Outreach Department.  You should take the physician order form and the  to your home laboratory when it is time to for this testing to be done.  Additional mailers can be obtained by calling the Transplant Office and asking to speak to a kidney and pancreas .      During this waiting period, we may request additional periodic laboratory tests with your primary physician.  It will be your responsibility to remind your physician to forward your results to the Transplant Office.      We need to be kept informed of any changes in your medical condition such as:    o changes in your medications,   o significant changes in your health  o significant infections (such as pneumonia or abscesses)  o blood transfusions  o any condition which requires hospitalization  o any  surgery  o initiation of dialysis      Remember to complete any routine cancer screening tests required before your transplant.  This includes colonoscopy; prostrate screening for men, and mammogram and gynecologic testing for women, as well as dental work.  Your primary care clinic can assist you with arranging for these exams.  Remind your caregivers to forward copies of the records and final reports.    We want you to know that our program has physician and surgeon coverage 24 hours a day, 365 days a year. If this coverage changes or there are substantial program changes, you will be notified in writing by letter.     Attached is a letter from the United Network for Organ Sharing (UNOS). It describes the services and information offered to patients by UNOS and the Organ Procurement and Transplantation Network.    We appreciate having had the opportunity to participate in your care.  Per our normal office work flow, your new wait list coordinator will be Thu Mcfadden RN, with the assistance of Zulma Baum LPN.  Thu can be reached at [phone].  If you have questions, please feel free to call the Transplant Office at [phone] or [phone].      Sincerely,   Sharona Tuttle RN, Pre-Kidney/Pancreas Transplant Coordinator  Kidney and Pancreas Transplant Program    Enclosures: [facility] Letter  CC:   Dr. Anushka Contreras, Dr. Liam Fernandez, Dr. Josefina Rico                                The Organ Procurement and Transplantation Network  Toll-free patient services line:     Your resource for organ transplant information    If you have a question regarding your own medical care, you always should call your transplant hospital first. However, for general organ transplant-related information, you can call the Organ Procurement and Transplantation Network (OPTN) toll-free patient services line at [phone]- 0260. Anyone, including potential transplant candidates, candidates, recipients, family members, friends, living  donors, and donor family members, can call this number to:          Talk about organ donation, living donation, the transplant process, the donation process, and transplant policies.    Get a free patient information kit with helpful booklets, waiting list and transplant information, and a list of all transplant hospitals.    Ask questions about the OPTN website (https://optn.transplant.hrsa.gov/), the United Network for Organ Sharing s (UNOS) website (https://unos.org/), or the UNOS website for living donors and transplant recipients. (https://www.transplantliving.org/).    Learn how the OPTN can help you.    Talk about any concerns that you may have with a transplant hospital.    The [facility] transplant system, the OPTN, is managed under federal contract by the United Network for Organ Sharing (UNOS), which is a non-profit charitable organization. The OPTN helps create and define organ sharing policies that make the best use of donated organs. This process continuously evaluating new advances and discoveries so policies can be adapted to best serve patients waiting for transplants. To do so, the OPTN works closely with transplant professionals, transplant patients, transplant candidates, donor families, living donors, and the public. All transplant programs and organ procurement organizations throughout the country are OPTN members and are obligated to follow the policies the OPTN creates for allocating organs.    The OPTN also is responsible for:      Providing educational material for patients, the public, and professionals.    Raising awareness of the need for donated organs and tissue.    Coordinating organ procurement, matching, and placement.    Collecting information about every organ transplant and donation that occurs in the [place].    Remember, you should contact your transplant hospital directly if you have questions or concerns about your own medical care including medical records, work-up  progress, and test results.    We are not your transplant hospital, and our staff will not be able to answer questions about your case, so please keep your transplant hospital s phone number handy.    However, while you research your transplant needs and learn as much as you can about transplantation and donation, we welcome your call to our toll-free patient services line at [phone].          Updated 4/1/2019

## 2022-06-29 NOTE — TELEPHONE ENCOUNTER
Image Review Meeting    ATTENDEES: Dr. Stewart    IMAGES REVIEWED: Ab/Pelv CT 4/26/21 done at [facility]     DECISION: Vessels suitable for transplant    INCIDENTALS: No

## 2022-06-29 NOTE — LETTER
Request for Records from Referring Providers Office for Patients Referred to [facility] Solid Organ Transplant Program    June 30, 2022    Re: Valery Talbot   [address] [address] [address]   1965      Please push the following images, and fax corresponding reports:    1. Most recent low dose Chest CT  2. Iliac ultrasound  3. Pulmonary Function Test        Requested imaging may be electronically sent to the [employer] system via [id number] DICOM connection. When unable to send imaging electronically, an exported DICOM CD may be sent. Please indicate when imaging has been sent electronically on your return cover sheet.    Requested pathology slides should be accompanied by the appropriate report from your institution.    When the patient is hand carrying requested records or the requested records are not at your facility, please indicate this information on a return cover sheet.    Please fax requested paper records to [phone].     Please send all scans/slides to:   [facility]  Solid Organ Transplant Office  [address] [place] [address], [address] [address]    Please call our office at [phone] if you have any questions or concerns.

## 2022-06-29 NOTE — Clinical Note
FYI pt is now listed Inactive status on the kidney alone and simultaneous kidney/panc wait list.  Thanks, Sharona

## 2022-06-30 ENCOUNTER — DOCUMENTATION ONLY (OUTPATIENT)
Dept: TRANSPLANT | Facility: CLINIC | Age: 57
End: 2022-06-30

## 2022-06-30 NOTE — TELEPHONE ENCOUNTER
Called pt and discussed that the surgeons reviewed her Ab/pelv CT done at [facility] on 4/26/21 and have approved her vasculature to proceed w/ transplant, therefore, per the Committee review we have listed the pt inactive status.  Discussed the remaining items needed from the pt:    1. Iliac US - done at [facility]: images requested   2. Mammogram - not UTD, pt will arrange w/ PCP  3. PFTs - done at [facility]: report requested   4. Colonoscopy record - placed call to [facility] - Dr. Kurtz, they are faxing report and path   5. Low dose Chest CT - done at [facility]: images requested   6. Angiogram - once pt initiates dialysis    Explained to the pt that she will now be working w/ wait list coordinator Thu Mcfadden RN and Zulma Baum LPN. Provided contact information for Thu.  Pt had no further questions at this time.

## 2022-07-19 ENCOUNTER — TELEPHONE (OUTPATIENT)
Dept: TRANSPLANT | Facility: CLINIC | Age: 57
End: 2022-07-19

## 2022-07-19 NOTE — TELEPHONE ENCOUNTER
Called to introduce myself as patient's transplant coordinator.  Reviewed we have received Chest CT scan and colonoscopy reports, but haven't received the images for the Chest CT and the iliac ultrasound.  Patient confirmed and provided contact information for [facility] (Chest CT) & [facility]; that she hasn't done her mammogram or the PFT's as she has been holding off until dialysis as she reports she is aware she needs to have the coronary angiogram and doesn't want to be on dialysis any longer than needed.  Patient then reports she is contemplating scheduling the mammogram and PFT's before she has a chest catheter placed (dialysis).  Requested she contact me once she has completed these tests.  She also reports she was very concerned that when she initially was called by the intake team member, she was told if she's already listed elsewhere, she wouldn't need to come to [facility]; that listing is the same everywhere.  Patient reports concern that patients are given misinformation as patients may multi list.  Offered to pass along patient's concern to my manager, but patient reports she did already report, but okay for me to report the incident as well.  I also offered patient the [employer] Patient [employer] contact information, but patient declines at this time.  Patient does report she has insurance questions for our .  Provided the 's contact information to patient. Provided my direct contact information for questions/concerns.

## 2022-07-20 ENCOUNTER — TELEPHONE (OUTPATIENT)
Dept: TRANSPLANT | Facility: CLINIC | Age: 57
End: 2022-07-20

## 2022-07-20 NOTE — TELEPHONE ENCOUNTER
I called Valery Talbot, [id number] and asked how I could help today 7/20/2022 at 3:45pm.    I met Valery in person on 5/25/2022 with Dr. Pratibha Rodriguez for PKE.    Valery called to voice concern of information obtained from pre coordinator regarding Wait List transfer time. Sharona sent her Wait Time transfer form to sign and designate transfering time.   [facility] recommend Angiogram be done prior to active Wait List at our facility.   Valery Talbot will keep her Wait List time ACTIVE locally until the [facility] team approved her.    Valery to determine where she wants to keep her longest Wait Time listing; [facility] or transfer to [facility].   She voiced concern of transfering time to [facility] when still INACTIVE from her Active status at [facility]. I said I would bring her concern to supervisor Carmelo White.     fyi to Thu Mcfadden and Carmelo White in July 20, 2022.

## 2022-07-25 ENCOUNTER — TELEPHONE (OUTPATIENT)
Dept: TRANSPLANT | Facility: CLINIC | Age: 57
End: 2022-07-25

## 2022-07-25 NOTE — LETTER
PHYSICIAN ORDERS      DATE & TIME ISSUED: 2022 2:12 PM  PATIENT NAME: Valery Talbot   : 1965     [facility] MR# [if applicable]: [id number]     DIAGNOSIS:  Organ Transplant Candidate, Former Tobacco Use  ICD-10 CODE: Z76.82, Z87.891    1) Pulmonary Function Testing    Any questions please call: Thu Mcfadden RN, Transplant Coordinator, [phone].  Please fax results to: [phone], ATTN: Thu Mcfadden RN    .      Pratibha Rodriguez MD FACS  Assistant Professor of Surgery  Director, Living Kidney Donor Program.

## 2022-07-25 NOTE — TELEPHONE ENCOUNTER
Patient reports she has a mammogram scheduled tomorrow, but needs an order for PFT's faxed to [facility], fax [phone], phone [phone].  Order letter faxed.  Also reviewed we will need patient to sign a release of information in order to request Chest CT scan images, so our administrative office staff will send the release form to patient's e-mail via [employer]. Patient verbalizes agreement with this plan.

## 2022-07-26 ENCOUNTER — TRANSFERRED RECORDS (OUTPATIENT)
Dept: HEALTH INFORMATION MANAGEMENT | Facility: CLINIC | Age: 57
End: 2022-07-26

## 2022-08-15 ENCOUNTER — TELEPHONE (OUTPATIENT)
Dept: TRANSPLANT | Facility: CLINIC | Age: 57
End: 2022-08-15

## 2022-08-15 NOTE — TELEPHONE ENCOUNTER
Left voice message for patient requesting return call with the name of the dialysis unit/city and the days of the week patient is attending dialysis treatments.

## 2022-08-15 NOTE — TELEPHONE ENCOUNTER
Transplant Social Work Services Phone Call      Data: Patient requesting information on ESRD Medicare.  Intervention: Patient indicated she had started dialysis two weeks ago and wanted to know if she should sign up for Medicare.  Assessment: Writer explained when patient will be eligible for Medicare, how Medicare and private insurance work together.  Patient indicated understanding and contact this writer with any further questions.  Education provided by SW: ESRD Medicare  Plan:SW will continue to be available if further needs are identified.

## 2022-08-16 ENCOUNTER — TELEPHONE (OUTPATIENT)
Dept: TRANSPLANT | Facility: CLINIC | Age: 57
End: 2022-08-16

## 2022-08-16 NOTE — TELEPHONE ENCOUNTER
"Patient reports she met with her cardiologist today and reports her cardiologist is very opposed to doing the angiogram as patient reports she has had 2 negative stress tests.  Patient reports her cardiologist noted there are recent studies that show keeping native kidney function preserved, producing urine improves her mortality rate on hemodialysis; that the dye used for the angiogram may cause additional damage to native kidneys; that before doing the angiogram, \"he wanted me to be aware of the consequences of doing it.\"   Patient report her cardiologist and nephrologist are sending a letter to be and requests the letter be reviewed by the transplant committee in support of patient not having the angiogram.  Patient reports if the angiogram is an absolute, then she may need to look at going to another program (e.g. [facility]).  Patient also requests I communicate this information to Dr. Rodriguez as she notes Dr. Rodriguez was supportive of patient just having the stress test.  Patient reports she is doing home hemodialysis with [facility], phone number [phone]; that the unit is not yet Medicare Certified.  Thanked patient for the information; noted will plan to present letter from patient's providers to the transplant committee once letter has been received; will also send a communication to Dr. Rodriguez per patient request.   "

## 2022-08-17 ENCOUNTER — MEDICAL CORRESPONDENCE (OUTPATIENT)
Dept: HEALTH INFORMATION MANAGEMENT | Facility: CLINIC | Age: 57
End: 2022-08-17

## 2022-08-18 ENCOUNTER — TELEPHONE (OUTPATIENT)
Dept: TRANSPLANT | Facility: CLINIC | Age: 57
End: 2022-08-18

## 2022-08-18 NOTE — TELEPHONE ENCOUNTER
Ciara will have copy of 8183 forms and nephrologist's progress notes faxed once available.  Faxing copy of listing letter to ATTN: ROBERT Avila.  Will have dialysis unit information added to EMR and patient chart

## 2022-08-22 ENCOUNTER — TELEPHONE (OUTPATIENT)
Dept: TRANSPLANT | Facility: CLINIC | Age: 57
End: 2022-08-22

## 2022-08-22 NOTE — TELEPHONE ENCOUNTER
Patient emailing letter of support from her specialists to not have a left heart catheterization.  Patient reports she wants to maintain native kidney function on dialysis (producing urine) as long as possible and understands the contrast dye used may affect function, which patient reports can affect her mortality on dialysis.  She also reports she will likely need another kidney transplant during her lifetime and may end up needing dialysis.  Per patient request, will present all information at next multi-disciplinary transplant committee meeting.  Patient verbalizes agreement with this plan.

## 2022-08-24 ENCOUNTER — COMMITTEE REVIEW (OUTPATIENT)
Dept: TRANSPLANT | Facility: CLINIC | Age: 57
End: 2022-08-24

## 2022-08-24 NOTE — COMMITTEE REVIEW
Abdominal Patient Discussion Note Transplant Coordinator: Thu Mcfadden  Transplant Surgeon:       Referring Physician: Referred Self    Committee Review Members:  Benja, Lefty Mahoney RD   Nephrology Renaldo Stockton MD, CHRISTINE Robison CNP, Jayda Jewell MD   Pharmacy Herminio Fuentes, [facility]    - Clinical Airam Paty Monzon, [facility], Dariana Dinero, [facility]   Transplant Thu Mcfadden, MARLENA, Sheyla Garces, RN, Sharona Tuttle, MARLENA, Connie Smyth, QUE, Yany Oconnor, MARLENA, Meena Stewart MD, Ashley Leary, MARLENA, Chika Murdock RN   Transplant Surgery Meena Stewart MD       Additional Discussion Notes and Findings:   Committee reviewed Dr. Santiago's (local cardiologist) letter regarding pursuing left heart catheterization versus nuclear myocardial perfusion stress test; concerns by patient and provider regarding effects on native kidney function.  The committee notes the diagnostic/screening left heart catheterization uses 15 ml of contrast which is a small amount versus a CT angiogram or intervention cardiac catheterization.  The committee notes the risk benefit  diagnostic left heart catheterization is worth moving forward; that the affect on native kidney function of the contrast dye is minimal in most instances, but not all.  The committee recommends patient proceed with the diagnostic/screening left heart catheterization for the patient to be considered for active status on the transplant waitlists.

## 2022-08-25 ENCOUNTER — TELEPHONE (OUTPATIENT)
Dept: TRANSPLANT | Facility: CLINIC | Age: 57
End: 2022-08-25

## 2022-08-25 NOTE — TELEPHONE ENCOUNTER
Update to patient the multi-disciplinary transplant committee reviewed local cardiologist's letter regarding stress test versus left heart catherterization (coronary angiogram) and the committee does still recommend patient undergo the screening left heart catherization/coronary angiogram as it is felt the it is of risk benefit for the patient.  Reviewed this test uses 15 ml of contrast dye and has minimal affect on native kidney function in most instances, but if patient requires an intervention (cardiac stenting or angioplasty), then additional contrast dye is used which may affect native kidney function.  Patient reports she will discuss with her cardiologist and  and then contact me with an update.

## 2022-09-13 ENCOUNTER — TRANSFERRED RECORDS (OUTPATIENT)
Dept: HEALTH INFORMATION MANAGEMENT | Facility: CLINIC | Age: 57
End: 2022-09-13

## 2022-09-30 NOTE — TELEPHONE ENCOUNTER
Attempted to reach pt's tx coordinator at [facility]- Mihcelle Oliver RN.  Left message w/ admin requesting listing date and transplant evaluation notes- particularly, pt's cardiac work up notes.  Provided direct line for follow up.    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 611 R shoulder stiffness, M25.411 R shoulder effusion, M62.81 R shoulder weakness, M25.511 R shoulder pain  Insurance/Certification information:  PT Insurance Information: [facility], 90 visits hard limit, no auth  Physician Information:   Dr. Ernie Wilson  Has the plan of care been signed (Y/N):        []  Yes  [x]  No     Date of Patient follow up with Physician:  22      Is this a Progress Report:     []  Yes  [x]  No        If Yes:  Date Range for reporting period:  Beginnin22  Ending    Progress report will be due (10 Rx or 30 days whichever is less):         Recertification will be due (POC Duration  / 90 days whichever is less): 22      Visit # Insurance Allowable Auth Required   In-person 3 90 visits (4 used already) []  Yes [x]  No    Telehealth - - []  Yes []  No    Total            Functional Scale: FOTO shoulder score: 36/100 = 36% ability, 64% disability  Date assessed:  22       Number of Comorbidities:  [x]0     []1-2    []3+    Latex Allergy:  [x]NO      []YES  Preferred Language for Healthcare:   [x]English       []other:      Pain level:       SUBJECTIVE:  Ps is relying just on OTC medication. Dr. Ernie Wilson D/C her sling use except when in crowds and has been cleared to drive. She reports the HEP is going well and feels she is making steady progress.      OBJECTIVE:   Observation: rounded shoulders  Test measurements:    PROM:  Flexion= 100 degrees  Abduction= 70 degrees  ER= 30 degrees scaption    RESTRICTIONS/PRECAUTIONS: s/p R shoulder SLAP and bicep tenodesis 9/20/22    Exercises/Interventions:     Therapeutic Ex (56133) Sets/Reps/ sec Notes/CUES   Table slides: flex, abd    Wand AAROM: ER    Scap squeeze    PROM elbow flex/ext    Shrugs     Pedulums X20 ea direction                             Pt edu: D/C sling but still no reaching or lifting, use of arm rest for return to work next week, icing. 5 min    Manual Intervention (86445)     PROM: flexion, ER, abduction 30 min    Jt Mobs: posterior: grad II-III 5 min                        NMR re-education (38842)  CUES NEEDED                                                Therapeutic Activity (65043)                              Game Ready 15 min Min Compression         Therapeutic Exercise and NMR EXR  [x] (03842) Provided verbal/tactile cueing for activities related to strengthening, flexibility, endurance, ROM  for improvements in scapular, scapulothoracic and UE control with self care, reaching, carrying, lifting, house/yardwork, driving/computer work.    [] (74199) Provided verbal/tactile cueing for activities related to improving balance, coordination, kinesthetic sense, posture, motor skill, proprioception  to assist with  scapular, scapulothoracic and UE control with self care, reaching, carrying, lifting, house/yardwork, driving/computer work. Therapeutic Activities:    [] (26637 or 98577) Provided verbal/tactile cueing for activities related to improving balance, coordination, kinesthetic sense, posture, motor skill, proprioception and motor activation to allow for proper function of scapular, scapulothoracic and UE control with self care, carrying, lifting, driving/computer work.      Home Exercise Program:    [] (97454) Reviewed/Progressed HEP activities related to strengthening, flexibility, endurance, ROM of scapular, scapulothoracic and UE control with self care, reaching, carrying, lifting, house/yardwork, driving/computer work  [] (91466) Reviewed/Progressed HEP activities related to improving balance, coordination, kinesthetic sense, posture, motor skill, proprioception of scapular, scapulothoracic and UE control with self care, reaching, carrying, lifting, house/yardwork, driving/computer work      Access Code: [email or web address]  URL: [email or web address]  Date: 09/27/2022  Prepared by: Ivelisse Deng    Exercises  Elbow Flexion PROM - 3 x daily - 7 x weekly - 1 sets - 10-20 reps  Seated Scapular Retraction - 3 x daily - 7 x weekly - 1 sets - 10-20 reps  Seated Shoulder Flexion Towel Slide at Table Top Full Range of Motion - 3 x daily - 7 x weekly - 1 sets - 10-20 reps  Standing Shoulder External Rotation AAROM with Dowel - 3 x daily - 7 x weekly - 1 sets - 10-20 reps  Added shrugs and abduction table slides 9/28/22    Manual Treatments:  PROM / STM / Oscillations-Mobs:  G-I, II, III, IV (PA's, Inf., Post.)  [x] (14935) Provided manual therapy to mobilize soft tissue/joints of cervical/CT, scapular GHJ and UE for the purpose of modulating pain, promoting relaxation,  increasing ROM, reducing/eliminating soft tissue swelling/inflammation/restriction, improving soft tissue extensibility and allowing for proper ROM for normal function with self care, reaching, carrying, lifting, house/yardwork, driving/computer work    Modalities: Game Ready to address inflammation and pain x 15 min    Charges  Timed Code Treatment Minutes: 35   Total Treatment Minutes: 50     [] EVAL (LOW) 07420   [] EVAL (MOD) 92156   [] EVAL (HIGH) 76012   [] RE-EVAL     [] UH(36540) x     [] IONTO  [] NMR (41237) x     [x] VASO  [x] Manual (88168) x  2    [] Other:  [] TA x      [] Mech Traction (53272)  [] ES(attended) (82847)      [] ES (un) (44227):     GOALS:  Patient stated goal: playing overhead volleyball  [] Progressing: [] Met: [] Not Met: [] Adjusted    Therapist goals for Patient:   Short Term Goals: To be achieved in: 2 weeks  1.  Independent in HEP and progression per patient tolerance, in order to prevent re-injury. [] Progressing: [] Met: [] Not Met: [] Adjusted  2. Patient will have a decrease in pain to facilitate improvement in movement, function, and ADLs as indicated by Functional Deficits. [] Progressing: [] Met: [] Not Met: [] Adjusted    Long Term Goals: To be achieved in: 12 weeks  1. Disability index score of 10% or better for the FOTO shoulder score to assist with reaching prior level of function. [] Progressing: [] Met: [] Not Met: [] Adjusted  2. Patient will demonstrate increased AROM to WNL and symmetrical to uninvolved limb to allow for proper joint functioning as indicated by patients Functional Deficits. [] Progressing: [] Met: [] Not Met: [] Adjusted  3. Patient will demonstrate an increase in Strength to 4+/5 to allow for proper functional mobility as indicated by patients Functional Deficits. [] Progressing: [] Met: [] Not Met: [] Adjusted  4. Patient will be able to lift a 5lbs object overhead with normal scapulohumeral rhythm without increased symptoms or restriction. [] Progressing: [] Met: [] Not Met: [] Adjusted  5. Pt will be able to perform UE plyometric activity in [place] or CKC without increased symptoms or restriction to assist her in returning to an active lifestyle and PLOF. [] Progressing: [] Met: [] Not Met: [] Adjusted     Progression Towards Functional goals:  [] Patient is progressing as expected towards functional goals listed. [] Progression is slowed due to complexities listed. [] Progression has been slowed due to co-morbidities. [x] Plan just implemented, too soon to assess goals progression  [] Other:     ASSESSMENT:  Pt PROM still making good progress and having less muscle guarding. Overall Progression Towards Functional goals/ Treatment Progress Update:  [] Patient is progressing as expected towards functional goals listed. [] Progression is slowed due to complexities/Impairments listed.   [] Progression has been slowed due to co-morbidities. [x] Plan just implemented, too soon to assess goals progression <30days   [] Goals require adjustment due to lack of progress  [] Patient is not progressing as expected and requires additional follow up with physician  [] Other    Prognosis for POC: [x] Good [] Fair  [] Poor      Patient requires continued skilled intervention: [x] Yes  [] No    Treatment/Activity Tolerance:  [x] Patient able to complete treatment  [] Patient limited by fatigue  [] Patient limited by pain    [] Patient limited by other medical complications  [] Other:         Return to Play: (if applicable)   []  Stage 1: Intro to Strength   []  Stage 2: Return to Run and Strength   []  Stage 3: Return to Jump and Strength   []  Stage 4: Dynamic Strength and Agility   []  Stage 5: Sport Specific Training     []  Ready to Return to Play, Meets All Above Stages   []  Not Ready for Return to Sports   Comments:                               PLAN: See eval. Pt to be seen 2 times a week for 12 weeks. [x] Continue per plan of care [] Alter current plan (see comments above)  [] Plan of care initiated [] Hold pending MD visit [] Discharge      Electronically signed by:  Tierra Villarreal PT    Note: If patient does not return for scheduled/ recommended follow up visits, this note will serve as a discharge from care along with most recent update on progress.

## 2022-12-12 NOTE — TELEPHONE ENCOUNTER
Thanked patient for having the angiogram results and correspondence faxed.  Reviewed we didn't receive a copy of patient's 2022 pulmonary function test report/results.  Reviewed my 0725/2022 phone note with the patient and provided patient with the [facility]' phone number, which she reports she will contact and ask to have the results faxed to the transplant office.

## 2022-12-13 NOTE — TELEPHONE ENCOUNTER
Image Review Meeting    ATTENDEES: STANTON Ramsey    IMAGES REVIEWED: No images; review of outside 07/2022 Chest CT report and 05/31/2022 abdominal aorta ultrasound report.    DECISION:  05/31/2022 ultrasound wasn't the correct ultrasound done.  Needs an iliac doppler ultrasound.  No additional recommendations after review of the Chest CT report.    INCIDENTALS: No

## 2022-12-22 NOTE — TELEPHONE ENCOUNTER
Update to patient I was unable to review at the transplant committee meeting yesterday as the meeting was short for time.  I apologized for the inconvenience.  As patient reports she is scheduled to have a CT abdomen/pelvis with contrast on 12//28/22 with the [facility] transplant program, I proposed to delay review with our transplant committee until 01/04/2023 so will have the CT scan report and will send Yeahka message to patient with the mailing address of [facility] and request the patient have [facility] request films/images from the CT be sent.  Also noted patient will need to have an Aorto Iliac ultrasound as the ultrasound she had in June 2022 was only of the aorta.  Faxing order for the ultrasound to patient's primary care provider per patient request.

## 2022-12-22 NOTE — LETTER
PHYSICIAN ORDERS      DATE & TIME ISSUED: 2022 10:16 AM  PATIENT NAME: Valery Talbot   : 1965     [facility] MR# [if applicable]: [id number]     DIAGNOSIS:  Organ Transplant Candidate, Type 1 Diabetes Mellitus  ICD-10 CODE: Z76.82, E10.9    1) US AORTA ILIAC DUPLEX ULTRASOUND to Assess blood vessels for transplant suitability.     Any questions please call: Thu Mcfadden RN, BSN, Transplant Coordinator, [phone].  Please fax results/report to [phone].      Pratibha Rodriguez MD FACS  Assistant Professor of Surgery  Director, Living Kidney Donor Program.

## 2023-01-01 ENCOUNTER — COMMITTEE REVIEW (OUTPATIENT)
Dept: TRANSPLANT | Facility: CLINIC | Age: 58
End: 2023-01-01
Payer: COMMERCIAL

## 2023-01-01 ENCOUNTER — TELEPHONE (OUTPATIENT)
Dept: TRANSPLANT | Facility: CLINIC | Age: 58
End: 2023-01-01
Payer: COMMERCIAL

## 2023-01-01 ENCOUNTER — DOCUMENTATION ONLY (OUTPATIENT)
Dept: TRANSPLANT | Facility: CLINIC | Age: 58
End: 2023-01-01
Payer: COMMERCIAL

## 2023-01-01 ENCOUNTER — TELEPHONE (OUTPATIENT)
Dept: TRANSPLANT | Facility: CLINIC | Age: 58
End: 2023-01-01

## 2023-01-01 ENCOUNTER — DOCUMENTATION ONLY (OUTPATIENT)
Dept: TRANSPLANT | Facility: CLINIC | Age: 58
End: 2023-01-01

## 2023-01-01 ENCOUNTER — HEALTH MAINTENANCE LETTER (OUTPATIENT)
Age: 58
End: 2023-01-01

## 2023-01-01 ENCOUNTER — LAB (OUTPATIENT)
Dept: LAB | Facility: CLINIC | Age: 58
End: 2023-01-01
Payer: COMMERCIAL

## 2023-01-01 ENCOUNTER — COMMITTEE REVIEW (OUTPATIENT)
Dept: TRANSPLANT | Facility: CLINIC | Age: 58
End: 2023-01-01

## 2023-01-01 ENCOUNTER — TRANSFERRED RECORDS (OUTPATIENT)
Dept: HEALTH INFORMATION MANAGEMENT | Facility: CLINIC | Age: 58
End: 2023-01-01
Payer: COMMERCIAL

## 2023-01-01 ENCOUNTER — TEAM CONFERENCE (OUTPATIENT)
Dept: TRANSPLANT | Facility: CLINIC | Age: 58
End: 2023-01-01
Payer: COMMERCIAL

## 2023-01-01 DIAGNOSIS — N18.6 ESRD (END STAGE RENAL DISEASE) (H): ICD-10-CM

## 2023-01-01 DIAGNOSIS — N18.6 ESRD (END STAGE RENAL DISEASE) (H): Primary | ICD-10-CM

## 2023-01-01 DIAGNOSIS — E10.9 DM TYPE 1 (DIABETES MELLITUS, TYPE 1) (H): ICD-10-CM

## 2023-01-01 DIAGNOSIS — Z76.82 ORGAN TRANSPLANT CANDIDATE: ICD-10-CM

## 2023-01-01 DIAGNOSIS — E10.9 DIABETES MELLITUS TYPE 1 (H): ICD-10-CM

## 2023-01-01 DIAGNOSIS — E10.9 DM TYPE 1 (DIABETES MELLITUS, TYPE 1) (H): Primary | ICD-10-CM

## 2023-01-01 LAB
PROTOCOL CUTOFF: NORMAL
SA 1 CELL: NORMAL
SA 1 TEST METHOD: NORMAL
SA 2 CELL: NORMAL
SA 2 TEST METHOD: NORMAL
SA1 HI RISK ABY: NORMAL
SA1 MOD RISK ABY: NORMAL
SA2 HI RISK ABY: NORMAL
SA2 MOD RISK ABY: NORMAL
UNACCEPTABLE ANTIGENS: NORMAL
UNOS CPRA: 0
ZZZSA 1  COMMENTS: NORMAL
ZZZSA 2 COMMENTS: NORMAL

## 2023-01-01 PROCEDURE — 86832 HLA CLASS I HIGH DEFIN QUAL: CPT

## 2023-01-01 PROCEDURE — 86833 HLA CLASS II HIGH DEFIN QUAL: CPT

## 2023-01-02 NOTE — TELEPHONE ENCOUNTER
Left voice message for Karla with request to have films from 12/27/2022 CTA abd/pelvis either pushed into PACS or mailed to [facility].  Requested return call for questions.

## 2023-01-04 NOTE — TELEPHONE ENCOUNTER
I called the Dialsysis unit: [facility]  and I learned the following    The facility name is [facility]  Valery is on a schedule of MWF with nurse coming to her home three times a week.     [facility]  : Facility Name in [place]  Address: [address] [place] [address] [address], [address], [place] [address].     3X week  regular hemo at home MWF   Phone [phone]   Fax [phone]    I sent this information to our Admin staff to have Epic enter new dialysis unit.

## 2023-01-04 NOTE — TELEPHONE ENCOUNTER
Update to patient that the multi-disciplinary transplant committee reviewed all current test results provider notes, and reports.  The Committee does recommend patient have a liver ultrasound as related to the hepatomegaly identified on the 12/28/2022 CTA abdomen/pelvis report.  Explained I have left a voice message for patient's local transplant coordinator to confirm whether other transplant providers also recommend the ultrasound or whether there is documentation that explains the finding.  Reviewed I will contact patient after I have received a response from the other transplant coordinator.  Patient verbalizes agreement with this plan.

## 2023-01-04 NOTE — TELEPHONE ENCOUNTER
Left voice message requesting return call from Karla.  Aj Acosta's team recommended additional testing for hepatomegaly identified on 12/28/022 CTA Abdomen/pelvis as our committee does recommend a liver ultrasound.

## 2023-01-04 NOTE — COMMITTEE REVIEW
Abdominal Patient Discussion Note Transplant Coordinator: Thu Mcfadden  Transplant Surgeon:       Referring Physician: Referred Self    Committee Review Members:  Benja, Lefty Mahoney, ALEX   Nephrology Jayda Jewell MD   Pharmacy Lexie Guevara, [facility]    - Clinical Airam Monzon, [facility], Dariana Dinero, [facility]   Transplant Thu Mcfadden, MARLENA, Sheyla Garces, RN, Sharona Tuttle, RN, Kermit Belle MD, Dariana Villanueva, MARLENA, Carmelo White, MARLENA, Ashley Leary RN       Additional Discussion Notes and Findings:   Committee reviewed 12/01/22 left heart catheterization report, July 2022 outside PFT report, 06/22/2022 outside chest CT report, 12/28/2022 liver function, hemoglobin A1C, Hep C AB, and Hep C DNA PCR results as well as transplant evaluation EBV results, and outside 12/28/2022 CTA abdomen/pelvis report.  As hepatomegaly noted on the 12/28/2022 CTA report, Committee recommends patient have a liver ultrasound before being considered for activation on the transplant waitlists.

## 2023-01-05 NOTE — LETTER
PHYSICIAN ORDERS      DATE & TIME ISSUED: 2023 9:56 AM  PATIENT NAME: Valery Talbot   : 1965     [facility] MR# [if applicable]: [id number]     DIAGNOSIS:  Organ Transplant Candidate, Hepatomegaly  ICD-10 CODE: Z76.82, R16.0    1) Obtain Abdominal ultrasound complete to assess liver.     Any questions please call: Thu Mcfadden RN, Transplant Coordinator at [phone].  Please fax report results to [phone]          Jayda Jewell MD

## 2023-01-05 NOTE — TELEPHONE ENCOUNTER
Patient reports she would like to have the liver ultrasound ordered by her primary care provider and requests order be faxed to this provider's office.  She also reports she spoke with her [facility] transplant coordinator who is going to review with Dr. Villafuerte.

## 2023-01-06 NOTE — TELEPHONE ENCOUNTER
Karla from [facility] called to touch base with the RNCC stated the call is not urgent. Would like a return call when able.

## 2023-03-07 NOTE — TELEPHONE ENCOUNTER
Patient reports she is not using supplemental oxygen, has hepatology clearance, and did NOT have aspiration pneumonia, and asks next steps.  Explained I'm not able to view the endoscopy report in care everywhere, so will contact [facility] records to request a copy.  Noted will try to have patient's information re-discussed at tomorrow's transplant committee meeting, but more likely will be re-discussed on 03/15/2023.  Noted will plan to contact patient with an update tomorrow.  Patient verbalizes agreement with this plan.

## 2023-03-08 NOTE — TELEPHONE ENCOUNTER
Left voice message for Karla requesting copy of 02/07/23 EGD report be faxed as well as iliac duplex ultrasound report.  Requested return call for questions.

## 2023-03-08 NOTE — TELEPHONE ENCOUNTER
Update to patient that I was unable to have her case re-discussed at transplant committee earlier today as we ran short on time; that I will plan to have the re-discussion at next week's meeting.  Patient verbalizes agreement with this plan.  Patient also reports she had an iliac duplex ultrasound as part of evaluation with Shola Greene.  Noted I will contact patient's transplant coordinator at [facility] to ask and confirm as I am unable to see any test result in Care Everywhere.

## 2023-03-15 NOTE — COMMITTEE REVIEW
Abdominal Patient Discussion Note Transplant Coordinator: Thu Mcfadden  Transplant Surgeon:       Referring Physician: Referred Self    Committee Review Members:  Nephrology Renaldo Stockton MD, CHRISTINE Robison [facility]   Pharmacy Lexie Guevara, [facility]    - Clinical Airam Monzon, [facility], Dariana Dinero, [facility]   Transplant Dixie Maria Helm PA-C, Thu Mcfadden, RN, Sharona Tuttle, RN, Dariana Villanueva, RN, Yany Oconnor, MARLENA, Ashley Leary RN   Transplant Surgery Pratibha Rodriguez MD, MD       Additional Discussion Notes and Findings: Committee reviewed all current cardiology information, liver MRI report and 02/03/2023 local hepatologists (CE) assessment and plan for return in 6 weeks with labs and dexascan; that provider okayed patient to move forward with transplant surgery.  Committee has no additional recommendations related to cardiology and hepatology.  Committee also reviewed OSH admissions in January 2023 and February 2023 related to fluid overload, hypertensive urgency and shortness of breath.  Committee recommends patient have no hospital admissions for 3 months related to these diagnoses before she may be considered for active status on the transplant waitlists.  Also, confirm with local dialysis unit that patient is performing home hemo adequately, although the patient may be doing dialysis as recommended and still have the issues.  Per Committee, patient will also need pulmonary clearance.  Reviewed status of iliac duplex ultrasound and Committee noted surgeons will not take a patient to the operating room for a transplant surgery without completing this test.

## 2023-03-15 NOTE — PROGRESS NOTES
I've used Anaid Garcia to request a dialysis center be added or edited. [email or web address]    MRN  [id number]    Patient started dialysis.  The dialysis center information needs to be added to the pick list for EPIC address book.      Carmelo White RN BSN advised I send the request to you to have it entered into Epic pick list.  Can you help?      Please add   [facility]   [address] , [address],  [place] [address].    Phone [phone]   Fax [phone]    Pt dialyzes 3 times week MWF.

## 2023-03-16 NOTE — TELEPHONE ENCOUNTER
"Reviewed Committee's recommendations for Valery; 1) Needs pulmonary clearance - Valery reports she met with the pulmonologist she saw in the hospital on 03/14/2023, who has ordered PFTS - requested once completed, she have the provider fax notes and results; 2) Aorto Iliac Duplex ultrasound - reviewed I faxed an order to her primary care clinic in December 2022 and per Dr. Rodriguez, Valery will need to complete this test before our surgeons will take her to the operating room to do a transplant.  Per request, I will contact primary care provider and request provider order the test as order was previously faxed.  3) Valery will need to remain free from being hospitalized for shortness of breath/fluid overload/hypertensive urgency for 3 months beginning the date of discharge from last hospitalization.  Valery confirms the admissions were all related to dialysis/not having enough dialyzed off.  \"They weren't having me remove enough fluid.\"  Valery verbalizes she understands the information presented and is in agreement with this plan.  Encouraged her to contact me should she have questions/concerns.   "

## 2023-03-16 NOTE — TELEPHONE ENCOUNTER
Shayla confirms hospital admissions were related to dialysis, but that fluid management/amount dialyzed has been corrected and patient is doing well.  Shayla also reports when patient started dialysis, she did have an allergy to the dialyzer.  Reviewed transplant committee's current recommendations; that Shayla does not need to draw PRA samples since patient remains inactive status on the transplant lists, but per Shayla's questions, encouraged her to contact patient's [facility] transplant coordinator to confirm whether that program wants Shayla to draw PRA samples.  Reviewed will plan to contact Shayla when patient is close to activating on our transplant waitlists to review drawing PRA samples. Encouraged Shayla to contact me should she have questions.

## 2023-03-16 NOTE — TELEPHONE ENCOUNTER
Left voice message requesting return call regarding Dr. Palomo ordering/scheduling Iliac ultrasound; reference order was faxed to the office on 12/22/2022.

## 2023-03-16 NOTE — TELEPHONE ENCOUNTER
Ciara reports she will send a message to Shayla to contact me; notes patient did have to get a different dialyzer as she had an allergy to the first one.

## 2023-06-20 NOTE — TELEPHONE ENCOUNTER
Reviewed with patient that I haven't received aorto iliac ultrasound report; that I left a voice message for her primary care provider on 2023 and requested return call if order needed and previously faxed an order in 2022.  Patient reports she hadn't heard anything from PCP's office, but will contact them now.  Explained I have pulmonary clearance information and patient has remained hospital free for 3 months. Patient reports her nephew is being tested as donor in [place] and is only a 3 out of 6 match and per patient is being asked by [place] program to give his kidney through [facility].  Reviewed that it may be the program wants to find a better match for the patient, but encouraged patient to talk with her [place] coordinator regarding questions.  Patient reports she has a different coordinator for living donor transplant and left a voice message for the coordinator yesterday and is awaiting a return call.  Per patient question, confirmed [facility] living donor kidney program also uses a steroid avoidance protocol and that for  donor kidney/pancreas, we don't necessarily wait for a 5 out of 6 match (HLA) as patient reports her mentor told her she received a kidney/pancreas that was 5 out of 6.   Patient reports she will contact primary care provider regarding the ultrasound and then contact me with an update.  She notes it's still her preference to have a  donor kidney/pancreas transplant.

## 2023-06-20 NOTE — TELEPHONE ENCOUNTER
"Patient reports she found an imaging center that can scheduled the iliac duplex ultrasound tomorrow and requests I e-mail the order.  Noted I can place the order letter in the e-mail, but cannot attach from electronic medical records.  She also reports she spoke with Dr. Palomo's office and learned the 03/16/2023 voice message I left was received, but my call was never returned and that Marisela from this provider's office is supposed to call me. Noted I can also try to contact Marisela.  Per patient request, I will try to review her case at tomorrow's transplant committee, but noted we have a full schedule of patients and the discussion may be delayed until next week.  Patient reports she would like to try to get activated on the waitlists by the 4th of July. Noted will may also need to obtain insurance prior authorization even if we obtain Committee okay to activate on the waitlists. Patient asks if this can be done ahead of time. Noted the insurance providers generally need the committee note that documents patient is okayed for transplant surgery.  Patient also reports she has questions regarding Medicare coverage after transplant \"if you don't apply for Medicare at time of your transplant, then when you reach age 65 to apply, they won't cover the anti rejection meds.  Your girl that I talked with at evaluation was very helpful.\"  Reviewed patient met with one of our licensed social workers, Yanci Monzon, who is currently out of the office. Patient reports she would like to speak with Yanci, but can wait until Yanci is back in the office.   Patient also requests I contact her transplant coordinator in [place] to confirm the other program didn't already do the ultrasound.  Noted I believe Karla and I reviewed this previously, but noted I can call her again to confirm.  "

## 2023-06-21 NOTE — COMMITTEE REVIEW
Abdominal Patient Discussion Note Transplant Coordinator: Thu Mcfadden  Transplant Surgeon:       Referring Physician: Referred Self    Committee Review Members:  Nephrology Dixie Helm PA-C, Jayda Jewell MD    - Clinical Dariana Dinero, [facility]   Transplant ALEJANDRO BELTRAN, RN, Thu Mcfadden, RN, Sharona Tuttle, RN, Carmelo White, RN, July Scruggs, RN, Yany Oconnor, RN   Transplant Surgery Pratibha Rodriguez MD, MD       Additional Discussion Notes and Findings:  Committee reviewed 04/03/2023 local pulmonary clearance note, 04/20/2023 local cardiologist clearance note, 03/14/2023 carotid us report (scanned), that patient has not been hospitalized since February 2023, and dialysis unit has reported patient has been doing her dialysis treatments appropriately.  Patient has had challenges with local provider trying to schedule the iliac duplex ultrasound.  Committee again recommends iliac ultrasound be completed and reviewed by transplant surgeon before being considered for active status, but if no other recommendations from transplant surgeon, patient may move forward with having status changed to Active on the kidney and kidney/pancreas lists.  Committee also note [facility] not [facility] with the transplant team as she is listed for kidney/pancreas transplant at [facility] and is being followed by another transplant group.

## 2023-06-22 NOTE — TELEPHONE ENCOUNTER
Updated patient that I verified if she has a kidney transplant followed by a pancreas transplant, our program does us the same steroid avoidance protocol, but I did note if a patient has an rejection episode early after transplant, he/she may need to take low dose steroids.  Patient reports she understands this information and that her nephew has surgery scheduled through [facility] in August, so if she does receive a living donor kidney through [facility], she will want to have a pancreas at [facility] since our program has steroid avoidance protocols. I did explain if she has the kidney first, she may need to come to [facility] for updated appointments with our providers before activating the pancreas waitlist.  Also reviewed before she may be activated on the kidney/pancreas and kidney waitlists, we will need to obtain prior authorization from her insurance carrier. Patient reports she will contact me after she has completed the iliac duplex ultrasound.

## 2023-06-22 NOTE — TELEPHONE ENCOUNTER
Update to patient that I was able to review all medical updates with the transplant committee yesterday.  Explained will need the iliac duplex ultrasound completed and once we receive the report, will request transplant surgeon review.  If nothing else is recommended after this review, then will confirm insurance approval, and if all in place with insurance approval, then will be able to change patient's status to Active on the transplant waitlists.  Noted if prior authorization is required by patient's insurance carrier, then this will need to be obtained before her status may be changed to active.  Reviewed current PRA sample will be needed, so per patient request, will have PRA  kits sent to patient and I will send patient the PRA order and fax a copy to her dialysis unit as patient confirms the dialysis unit draws her labs.  Patient reports she spoke with her transplant coordinator at [facility] and confirmed she did not have an iliac ultrasound there, but notes she is now trying to get one scheduled. Patient requests I verify with transplant surgeon that if patient were to have a living donor kidney followed by  donor pancreas, will she be required to take steroids after the pancreas transplant as she will have different donors and needs to get an answer to this question as soon as possible.  Noted I will send a message to one of our surgeons as soon as possible.

## 2023-06-22 NOTE — LETTER
PHYSICIAN ORDER   ALA/PRA and HLA BLOOD    DATE & TIME ISSUED: 2023 12:17 PM  PATIENT NAME: Valery Talbot   : 1965     [facility] MR# [if applicable]: [id number]     DIAGNOSIS/ICD-10 CODE: Awaiting Organ transplant [Z76.82}   EXPIRES: (1 YEAR AFTER DATE ISSUED)  One Time  1. Please draw 20ml of blood in red top (plain) tube for Antileukocyte Antibody (ALA or PRA).   2. Label tubes with the patient s name, complete lab slip.         3. Mailers, lab slips with instructions are sent to patient separately.      4. Call the Outreach Lab at [phone] to reorder mailers.       5. Mail blood to (this address is also on the mailers):    IMMUNOLOGY LABORATORY  [facility]  Room 7139 [address]  [address]      Pratibha Rodriguez MD FACS  Associate Professor of Surgery  Director, Living Kidney Donor Program.

## 2023-06-26 NOTE — TELEPHONE ENCOUNTER
Left voice message requesting return call as need to have images/films sent from 12/28/2022 CTA abdomen and pelvis as I am able to review the report in Care Everywhere, but also need the films to be pushed or mailed to [facility].

## 2023-07-05 NOTE — PROGRESS NOTES
Carmelo White RN Supervisor entered the [facility] address, phone fax information into the IT HUB, so this provider will be added to the [place] Epic data pool for caregivers.    Carmelo will get the confirmation when [facility] from [place]  has been loaded/entered into the [place] Epic data pool.      deborah Mcfadden RN BSN

## 2023-07-10 NOTE — TELEPHONE ENCOUNTER
Per Marisela's request, left voice message with the transplant office fax number, but also noted I already received a faxed copy of the iliac ultrasound report.  Requested return call for questions.

## 2023-07-11 NOTE — TELEPHONE ENCOUNTER
Image Review Meeting    ATTENDEES: Frances Ramsey    IMAGES REVIEWED: 07/06/2023 Aorto Iliac duplex report    DECISION: Since patient completed CTA abdomen/pelvis in December 2022, review films before activating on the kidney and kidney/pancreas lists.    INCIDENTALS: No

## 2023-07-13 NOTE — TELEPHONE ENCOUNTER
LEft a 2nd voice message with request CTA abdomen/pelvis films 12/28/22 be pushed or mailed to [facility].  Also left the mailing address for [facility]'s radiology department.  Requested return call to confirm my message has been received and whether facility needs anything else from our program.

## 2023-07-18 NOTE — TELEPHONE ENCOUNTER
Noted I need to speak with medical records and transferred my call, but did not provide the correct phone number.   Spoke with Yolis who reports an signed ASHOK needs to be faxed to [phone] and the films will be sent via [employer]; that the department does not have expedited way to send, unless we provide a [employer] account number.

## 2023-07-19 NOTE — TELEPHONE ENCOUNTER
Reviewed with patient she has 384 days of wait time accumulated on [facility]'s kidney and kidney/pancreas transplant lists; that currently I don't have other patients who have blood type B actively listed that have more accumulated wait time than the patient.  Also reviewed I do not have access to other kidney/pancreas regional transplant centers's [facility], so I cannot clearly confirm whether there are other patients with blood type B listed actively for kidney/pancreas at these center's.  Per patient request, will plan to contact her in 1 week to update whether our imaging area has received the images as patient reports she was told it would take 3-5 days to mail the images from [facility].  Also noted will start working on the insurance prior approval request now, so that we have it in place.  Patient reports she does have a direct living donor kidney transplant surgery scheduled with her nephew on 08/21/2023, but she prefers to get the kidney/pancreas and do one surgery and also prefers our program as we are a steroid avoidance program.

## 2023-07-19 NOTE — TELEPHONE ENCOUNTER
Transplant Social Work Services Phone Call      Data: Informed patient had requested to speak with  regarding ESRD Medicare.  Intervention: Writer called and spoke with patient.  Assessment: Patient indicated she is having a living donor transplant in [place] in August and did not understand ESRD Medicare.  Writer explained and patient indicated understanding.  Education provided by SW: ESRD Medicare  Plan:SW will remain available to assist as indicated.

## 2023-07-19 NOTE — TELEPHONE ENCOUNTER
"Updated patient the iliac ultrasound report has been received and reviewed by Dr. Rodriguez who noted we will need to obtain the images from patient's 12/28/2022 CTA Abdomen Pelvis ordered by the [facility] program.  Explained I have already made attempts to obtain the films and was finally able to talk with someone at [facility] yesterday who confirmed a signed release is needed before they can mail the images to us.  Patient reports she has already signed releases, so I provided patient with the phone number I contacted yesterday.  Patient reports she will call me with an update, but requests to know \"where I am on your transplant list.\"    "

## 2023-07-25 NOTE — TELEPHONE ENCOUNTER
Updated patient that I just spoke with our film room and CT images on CD from [facility] haven't arrived.  I will check again tomorrow. Also reviewed have requested [facility] initiate prior authorization request process for kidney/pancreas transplant with patient's insurance.  Noted will contact patient with an update regarding the imaging CD late tomorrow afternoon.  Patient verbalizes agreement with this plan.

## 2023-07-26 NOTE — TELEPHONE ENCOUNTER
Updated patient I confirmed with our imaging department the CD from [facility] has not yet arrived, so I will check on this tomorrow.  Also reviewed my communication with [facility] , Sachi and determined should wait to initiate the prior authorization request with patient's insurance as insurance will need a note that indicates patient has been approved to proceed with transplant surgery.  Explained once I know the CD has been received and images loaded, I will send a message request to our surgeons to review and after review, if no additional recommendations, then I will place a note on patient's chart and contact Sachi to initiate the prior authorization request.  Patient verbalizes she understands this information and agrees with this plan.

## 2023-07-28 NOTE — TELEPHONE ENCOUNTER
Updated patient that the CD hasn't yet arrived in our imaging area and I will continue to check with our imaging/film desk.  Patient reports she will contact [facility] now.

## 2023-08-01 NOTE — TELEPHONE ENCOUNTER
Updated patient that one of our transplant surgeons just completed review of the outside 12/28/2022 CTA films and noted vessels okay for kidney/pancreas transplant and okay to proceed.  Reviewed I sent a message to patient's assigned [facility] transplant  to request initiation of prior approval request from patient's insurance provider.  Patient also reports she had a new PRA sample drawn on 07/26/ or 07/28 and sent, so I will send a communication to the immunology lab as currently the sample hasn't yet arrived.  Noted will contact patient when I have an update regarding insurance prior approval.  Patient verbalizes agreement with this plan.

## 2023-08-01 NOTE — PROGRESS NOTES
Image Review     ATTENDEES: GUSTAVO Belle. MD Transplant Surgeon, STANTON Mcfadden RN    IMAGES REVIEWED: 12/28/2022 CTA Abdomen/pelvis w and wo contrast outside images    DECISION: Patient has suitable vessels for kidney and pancreas transplant and may proceed.

## 2023-08-01 NOTE — TELEPHONE ENCOUNTER
Updated patient that our imaging department just received the CD with December 2022 CTA abdomen/pelvis films; that we have a standing image review meeting later today with transplant surgery and I will request the films be reviewed.  Noted will contact patient with next steps once this has been completed.  Patient verbalizes agreement with this plan.

## 2023-08-17 NOTE — TELEPHONE ENCOUNTER
"Updated patient we just received insurance prior approval to activate on the kidney and kidney/pancreas transplant waitlists.  Explained I did have one of our providers review 08/10/2023 pre operative assessments and test results from Shola Greene and it is felt she is a good transplant candidate to move forward with active status.  Noted since patient is already scheduled for the living donor kidney transplant on 2023, she may wish to remain listed inactive status [facility]'s kidney and kidney/pancreas transplant lists as I cannot confirm the likelihood she will receive any organ offers prior to 2023 and do not want to confuse things with intended surgery.  Patient reports she does want to be activated on the [facility] kidney/pancreas list now, but does not want to be activated on the kidney list.  \"I want a kidney/pancreas from you, not just a kidney.\"  Again reviewed that should she receive the living donor kidney first, then she will be removed from [facility]'s kidney and kidney/pancreas lists and be listed with inactive status (on hold) on the pancreas transplant list; that after she has had some time to recover and when her local team determines her transplanted kidney function is stable, before being considered for active status on the pancreas list, she may need to return to [place] for appointments with our transplant providers.  Patient verbalizes she understands this information.         Discussed:   - Pt is eligible for  donor offers and pt can receive calls 24 hours a day, 7 days a week, and on call staff need to be able to reach pt or one of emergency contacts within 30 minutes or they might skip over to next recipient on list.   -Please make sure your phone is charged at all times and your voicemail is not full   -Your transplant on call coordinator will give you directions about when and where you will need to come to the hospital for transplant  -The transplant coordinator will call you " to discuss your current health status, the offer, and plans to move forward.  Some organ offer calls will require you to come to the hospital immediately; some may not be as time sensitive.  It may be possible for you to come to the hospital and, for various reasons, the transplant could be cancelled.  This is often called a  dry run .  - Reviewed the right to accept or decline offer, without penalty  - Expected length of surgical procedure is about 4-6 hours, expected inpatient length of stay post transplant is 7-10days, and potential to stay lfor 3 weeks locally post transplant.   - Verified contact information as documented in Epic. Confirmed emergency contact information  -Instructed patient about importance of having PRAs drawn monthly via: (Mailers/[facility]). Encouraged them to set reminder in their preferred calendar to stay on top of their quarterly labs  -Reminded pt to stay up on health maintenance and to call with any updates on their health status, insurance or contact information.   -Reminded pt to inform RNCC as soon as possible if they test positive for COVID.     -Provided name and contact information for additional questions or concerns.  Pt expressed excellent understanding of all and was in good agreement with the plan.     8/18/23 addendum: Verified listing in Epic and [employer] as active on the kidney/pancreas waitlist. Pt remains inactive on the kidney alone waitlist per pt choice. -Dariana BRITO RN

## 2023-08-17 NOTE — LETTER
PHYSICIAN ORDER   ALA/PRA BLOOD    DATE & TIME ISSUED: 2023 5:30 PM  PATIENT NAME: Valery Talbot   : 1965     [facility] MR# [if applicable]: [id number]     DIAGNOSIS/ICD-10 CODE:Awaiting Organ transplant [Z76.82}  EXPIRES: (1 YEAR AFTER DATE ISSUED)  Monthly  1. Please draw 3ml of blood in red top (plain) tube for Antileukocyte Antibody (ALA or PRA).   2. Label tubes with the patient s name, date of birth, and complete lab slip.        3. Mailers, lab slips with instructions are sent to patient separately.      4. Call the Out[facility] Lab at [phone] to reorder mailers.       5. Mail blood to (this address is also on the mailers):    IMMUNOLOGY LABORATORY   [facility]   [address] [place] [address]      .  Laury Costa in Immunology and Transplantation  Surgical Director, Kidney & Pancreas Transplant Programs  Medical Director, Solid Organ Transplant Unit

## 2023-08-17 NOTE — LETTER
2023    Valery Talbot  [address] [address] [address]      Dear Ms. Talbot,    This letter is sent to confirm that you have completed your transplant work-up and you are a candidate in the kidney and pancreas transplant program at the [facility].  You were placed on the kidney and pancreas active waitlist on 2023.  Please note, per your request of 2023, your status remains Inactive (on hold) on the kidney waitlist, which means you will continue to accumulate wait time, but are only eligible to receive calls regarding simultaneous kidney/pancreas offers.      When you are active on the waitlist and an organ becomes available, a coordinator will need to speak to you immediately.  You could be contacted at any time during the day or night as an organ could become available at any time.  Please make certain our office always has your current telephone numbers and address.      Items we will need from you:    We have received approval from your insurance company for the transplant procedure.  It is critical that you notify us if there is any change in your insurance.  It is also important that you familiarize yourself with the details of your specific insurance policy.  Our patient  is available to assist you if you should have any questions regarding your coverage.    An ALA or PRA blood sample may needs to be sent monthly to match you with  donors or any potential living donors.  If you need this testing, special mailing boxes (called mailers) will be sent to you directly from the Outreach Department.  You should take the physician order form and the  to your home laboratory when it is time to for this testing to be done.  Additional mailers can be obtained by calling the Transplant Office and asking to speak to a kidney and pancreas .    During this waiting period, we may  request additional periodic laboratory tests with your primary physician.  It will be your responsibility to remind your physician to forward your results to the Transplant Office.  We need to be kept informed of any changes in your medical condition such as:        changes in your medications,   significant changes in your health  significant infections (such as pneumonia or abscesses)  blood transfusions  any condition which requires hospitalization  any surgery    Remember to complete any routine cancer screening tests required before your transplant.  This includes colonoscopy; prostate screening for men, and mammogram and gynecologic testing for women, as well as dental work.  Your primary care clinic can assist you with arranging for these exams.  Remind your caregivers to forward copies of the records and final reports.      We want you to know that our program has physician and surgeon coverage 24 hours a day, 365 days a year. In addition, our transplant surgeons and physicians will not be on call for two or more transplant programs more than 30 miles apart unless the circumstances have been reviewed and approved by the [employer] ([employer]) Membership and Professional Standards Committee (MPSC). Finally, our primary physician and primary surgeons are not designated as the primary surgeon or primary physician at more than 1 transplant hospital. If this coverage changes or there are substantial program changes, you will be notified in writing by letter.     Attached is a letter from [employer] that describes the services and information offered to patients by [employer] and the Organ Procurement and Transplantation Network (OPTN).    We appreciate having had the opportunity to participate in your care.  If you have questions, please feel free to call me directly at [phone] or the Transplant Office at [phone] or [phone].      Sincerely,    Thu Mcfadden RN, BSN  Transplant Coordinator   On  Behalf of Solid Organ Transplant  [facility]      Enclosures: ALA/PRA Physician Order, Telephone Contact List, Travel Resources, [employer] Letter, Waitlist Information Update, and While You Are Waiting  cc: Care Team          The Organ Procurement and Transplantation Network Toll-free patient services line:  [phone]  Your resource for organ transplant information    Staffed 8:30 am - 5:00 pm ET Monday - Friday Leave a message 24/7 to receive a call back    The Organ Procurement and Transplantation Network (OPTN) is the national transplant system. It makes the policies that decide how donated organs are matched to patients waiting for a transplant. The OPTN:  Makes sure donated organs get matched to people on the transplant waiting list  Tells people about the donation and transplant processes  Makes sure that the public knows about the need for more organ and tissue donations    The OPTN has a free patient services line that you can call to:  Get more information about:  Organ donation and organ transplants  Donation and transplant policies  Get an information kit with:  A list of transplant hospitals  Waiting list information  Talk about any questions you may have about your transplant hospital or organ procurement organization. The staff will do their best to help you or point you to others who may help.  Find out how you can volunteer with the OPTN and help shape transplant policy   The patient services line number is: 2-145-441-1064  Patient services line staff CANNOT answer questions about your own medical care, including:  Waiting list status  Test results  Medical records  You will need to call your transplant hospital for this information.  The following websites have more information about transplantation and donation:    OPTN: https://optn.transplant.hrsa.gov/    For potential living donors and transplant recipients:    Living with transplant: https://www.transplantliving.org/    Living donation  process: https://optn.transplant.hrsa.gov/living-donation/    Financial assistance: https://www.livingdonorassistance.org/  Transplantation data: https://www.srtr.org/  Organ donation: https://www.organdonor.gov/    Volunteer with the OPTN: https://optn.transplant.hrsa.gov/get-inv

## 2023-10-11 ENCOUNTER — POST MORTEM DOCUMENTATION (OUTPATIENT)
Dept: TRANSPLANT | Facility: CLINIC | Age: 58
End: 2023-10-11
Payer: COMMERCIAL

## 2023-10-11 NOTE — PROGRESS NOTES
Received notification on 10/10/2023  of patient's death from ?pneumonia, respiratory failure, contact information is Care Everywhere.  Place of death was reported as [facility].  Graft status at the time of death was reported as Functioning.  Additional information:   TIS verification is: Complete  The Transplant Office has been notified that patient is . The Post Mortem Encounter has been completed. Notifications have been sent to the Immunology lab, [facility] , and Zulma Baum .   Instructions have been sent to discontinue pending orders and send a sympathy card to the family.